# Patient Record
Sex: FEMALE | Race: WHITE | NOT HISPANIC OR LATINO | Employment: OTHER | URBAN - METROPOLITAN AREA
[De-identification: names, ages, dates, MRNs, and addresses within clinical notes are randomized per-mention and may not be internally consistent; named-entity substitution may affect disease eponyms.]

---

## 2020-08-19 ENCOUNTER — OFFICE VISIT (OUTPATIENT)
Dept: GASTROENTEROLOGY | Facility: CLINIC | Age: 50
End: 2020-08-19

## 2020-08-19 ENCOUNTER — LAB (OUTPATIENT)
Dept: LAB | Facility: HOSPITAL | Age: 50
End: 2020-08-19

## 2020-08-19 ENCOUNTER — PREP FOR SURGERY (OUTPATIENT)
Dept: OTHER | Facility: HOSPITAL | Age: 50
End: 2020-08-19

## 2020-08-19 VITALS
TEMPERATURE: 98 F | DIASTOLIC BLOOD PRESSURE: 74 MMHG | HEIGHT: 61 IN | SYSTOLIC BLOOD PRESSURE: 112 MMHG | HEART RATE: 74 BPM | BODY MASS INDEX: 36.44 KG/M2 | WEIGHT: 193 LBS | OXYGEN SATURATION: 98 %

## 2020-08-19 DIAGNOSIS — Z12.11 ENCOUNTER FOR SCREENING FOR MALIGNANT NEOPLASM OF COLON: ICD-10-CM

## 2020-08-19 DIAGNOSIS — R13.19 ESOPHAGEAL DYSPHAGIA: ICD-10-CM

## 2020-08-19 DIAGNOSIS — D64.9 ANEMIA, UNSPECIFIED TYPE: Primary | ICD-10-CM

## 2020-08-19 DIAGNOSIS — K59.04 CHRONIC IDIOPATHIC CONSTIPATION: ICD-10-CM

## 2020-08-19 DIAGNOSIS — K21.9 GASTROESOPHAGEAL REFLUX DISEASE, ESOPHAGITIS PRESENCE NOT SPECIFIED: ICD-10-CM

## 2020-08-19 LAB
FERRITIN SERPL-MCNC: 26.3 NG/ML (ref 13–150)
FOLATE SERPL-MCNC: >20 NG/ML (ref 4.78–24.2)
IRON 24H UR-MRATE: 40 MCG/DL (ref 37–145)
IRON SATN MFR SERPL: 7 % (ref 20–50)
TIBC SERPL-MCNC: 595 MCG/DL (ref 298–536)
TRANSFERRIN SERPL-MCNC: 399 MG/DL (ref 200–360)
VIT B12 BLD-MCNC: 481 PG/ML (ref 211–946)

## 2020-08-19 PROCEDURE — 83540 ASSAY OF IRON: CPT | Performed by: NURSE PRACTITIONER

## 2020-08-19 PROCEDURE — 82728 ASSAY OF FERRITIN: CPT | Performed by: NURSE PRACTITIONER

## 2020-08-19 PROCEDURE — 84466 ASSAY OF TRANSFERRIN: CPT | Performed by: NURSE PRACTITIONER

## 2020-08-19 PROCEDURE — 36415 COLL VENOUS BLD VENIPUNCTURE: CPT | Performed by: NURSE PRACTITIONER

## 2020-08-19 PROCEDURE — 82607 VITAMIN B-12: CPT | Performed by: NURSE PRACTITIONER

## 2020-08-19 PROCEDURE — 99214 OFFICE O/P EST MOD 30 MIN: CPT | Performed by: NURSE PRACTITIONER

## 2020-08-19 PROCEDURE — 82746 ASSAY OF FOLIC ACID SERUM: CPT | Performed by: NURSE PRACTITIONER

## 2020-08-19 RX ORDER — PROPRANOLOL HYDROCHLORIDE 10 MG/1
10 TABLET ORAL 3 TIMES DAILY
COMMUNITY

## 2020-08-19 RX ORDER — DULOXETIN HYDROCHLORIDE 60 MG/1
60 CAPSULE, DELAYED RELEASE ORAL DAILY
COMMUNITY
Start: 2017-07-04

## 2020-08-19 RX ORDER — FOLIC ACID 1 MG/1
1 TABLET ORAL DAILY
COMMUNITY
Start: 2020-05-08

## 2020-08-19 RX ORDER — DOCUSATE SODIUM 100 MG/1
100 CAPSULE, LIQUID FILLED ORAL DAILY
COMMUNITY
End: 2020-12-02 | Stop reason: SDUPTHER

## 2020-08-19 RX ORDER — LANSOPRAZOLE 30 MG/1
30 CAPSULE, DELAYED RELEASE ORAL DAILY
COMMUNITY
Start: 2019-07-22 | End: 2020-12-02 | Stop reason: SDUPTHER

## 2020-08-19 RX ORDER — METHOTREXATE 25 MG/ML
80 INJECTION, SOLUTION INTRA-ARTERIAL; INTRAMUSCULAR; INTRAVENOUS DAILY
COMMUNITY
Start: 2019-09-17 | End: 2020-12-02

## 2020-08-19 RX ORDER — ALBUTEROL SULFATE 90 UG/1
2 AEROSOL, METERED RESPIRATORY (INHALATION)
COMMUNITY
Start: 2017-04-03

## 2020-08-19 RX ORDER — PREDNISONE 1 MG/1
5 TABLET ORAL DAILY
COMMUNITY
Start: 2020-05-08

## 2020-08-19 RX ORDER — CHOLECALCIFEROL (VITAMIN D3) 50 MCG
2000 TABLET ORAL DAILY
COMMUNITY
Start: 2018-10-23 | End: 2023-02-14

## 2020-08-19 RX ORDER — CHLORTHALIDONE 25 MG/1
25 TABLET ORAL DAILY
COMMUNITY
End: 2023-02-14

## 2020-08-19 RX ORDER — SUCRALFATE 1 G/1
1 TABLET ORAL 2 TIMES DAILY
Status: ON HOLD | COMMUNITY
End: 2021-02-01

## 2020-08-19 RX ORDER — FERROUS SULFATE 325(65) MG
325 TABLET ORAL
Qty: 30 TABLET | Refills: 3 | Status: SHIPPED | OUTPATIENT
Start: 2020-08-19 | End: 2021-03-15

## 2020-08-19 RX ORDER — AMLODIPINE BESYLATE 10 MG/1
10 TABLET ORAL DAILY
COMMUNITY

## 2020-08-19 RX ORDER — QUETIAPINE FUMARATE 100 MG/1
300 TABLET, FILM COATED ORAL NIGHTLY
COMMUNITY

## 2020-08-19 RX ORDER — POTASSIUM CHLORIDE 20 MEQ/1
20 TABLET, EXTENDED RELEASE ORAL 2 TIMES DAILY
COMMUNITY

## 2020-08-19 NOTE — PROGRESS NOTES
Chief Complaint   Patient presents with   • Constipation   • Colonoscopy       HPI  Patient is a 50-year-old female who presents today for follow-up.  She was referred from her primary care provider to discuss screening colonoscopy.    She has a history of C. difficile, chronic constipation, GERD, esophageal Candida.  She had an EGD June 2019 with gastritis and a gastric ulcer and antral biopsies with focal intestinal metaplasia indeterminate for dysplasia.  Her most recent EGD was August 2019.  She had mild gastritis but the ulcer had healed.  Antral and gastric body biopsies with no intestinal metaplasia and no H. Pylori.    Colonoscopy had previously been attempted however she had poor bowel prep and Cologuard was recommended however insurance would not cover it at the time.    She had recent lab work with a CBC showing anemia with hemoglobin 9.2, hematocrit 31.9.  Per review, January 2020 hemoglobin was 8.8 and hematocrit 29.9.    Patient presents today for follow-up.  She reports she has had continued issues with constipation since her last office visit.  She is currently having around 2 bowel movements per week with the use of stool softeners twice daily.  She has previously tried MiraLAX with no improvement and Dulcolax with no significant improvement.  She also had previously tried Amitiza and Linzess with no improvement.  She denies any blood in the stool or dark stool.  She feels bloated and has some generalized abdominal discomfort secondary to the constipation.    She has noted some increased symptoms of reflux recently.  She has had increased belching and regurgitation.  She has had continued issues with dysphasia, feels as though when she is eating food becomes lodged in her chest.  She often times has to get up and move around to get the food to pass down but she has no forced regurgitation.  She denies any nausea or vomiting.    Patient reports the anemia is a new finding.  She has again not noted  any rectal bleeding or melena and denies any vaginal bleeding or hematuria.    Review of Systems   Constitutional: Positive for fatigue and unexpected weight change. Negative for appetite change, chills, diaphoresis and fever.   HENT: Negative for dental problem, ear pain, mouth sores, rhinorrhea, sore throat and voice change.    Eyes: Negative for pain, redness and visual disturbance.   Respiratory: Positive for cough and wheezing. Negative for chest tightness.    Cardiovascular: Positive for leg swelling. Negative for chest pain and palpitations.   Endocrine: Positive for cold intolerance, heat intolerance and polyphagia. Negative for polydipsia and polyuria.   Genitourinary: Negative for dysuria, frequency, hematuria and urgency.   Musculoskeletal: Positive for arthralgias, back pain, joint swelling, myalgias and neck pain.   Skin: Negative for rash.   Allergic/Immunologic: Positive for food allergies. Negative for environmental allergies and immunocompromised state.   Neurological: Negative for dizziness, seizures, weakness, numbness and headaches.   Hematological: Does not bruise/bleed easily.   Psychiatric/Behavioral: Positive for sleep disturbance. The patient is nervous/anxious.         Problem List:  There is no problem list on file for this patient.      Medical History:    Past Medical History:   Diagnosis Date   • Asthma    • Depression    • Fibromyalgia    • GERD (gastroesophageal reflux disease)    • Hypertension    • IBS (irritable bowel syndrome)    • Insomnia    • Peptic ulcer    • RA (rheumatoid arthritis) (CMS/[facility])         Social History:    Social History     Socioeconomic History   • Marital status:      Spouse name: Not on file   • Number of children: Not on file   • Years of education: Not on file   • Highest education level: Not on file   Tobacco Use   • Smoking status: Current Every Day Smoker   • Smokeless tobacco: Never Used   Substance and Sexual Activity   • Alcohol use: Not  Currently   • Drug use: Never   • Sexual activity: Defer       Family History:   Family History   Problem Relation Age of Onset   • Colon polyps Mother    • Colon polyps Father    • Crohn's disease Sister    • Colon cancer Maternal Grandmother    • Colon cancer Maternal Grandfather        Surgical History:   Past Surgical History:   Procedure Laterality Date   • APPENDECTOMY     • BREAST SURGERY     •  SECTION     • HYSTERECTOMY     • KNEE SURGERY Left    • TONSILLECTOMY     • WRIST SURGERY           Current Outpatient Medications:   •  Abatacept 125 MG/ML solution auto-injector, Inject 125 mg under the skin into the appropriate area as directed Every 7 (Seven) Days., Disp: , Rfl:   •  albuterol sulfate HFA (Ventolin HFA) 108 (90 Base) MCG/ACT inhaler, Inhale 2 puffs., Disp: , Rfl:   •  amLODIPine (NORVASC) 10 MG tablet, Take 10 mg by mouth Daily., Disp: , Rfl:   •  Aspirin Buf,CaCarb-MgCarb-MgO, 81 MG tablet, Take 81 mg by mouth., Disp: , Rfl:   •  chlorthalidone (HYGROTON) 25 MG tablet, Take 25 mg by mouth Daily., Disp: , Rfl:   •  Cholecalciferol (VITAMIN D) 50 MCG ( UT) tablet, Take 2,000 Units by mouth Daily., Disp: , Rfl:   •  docusate sodium (COLACE) 100 MG capsule, Take 100 mg by mouth Daily., Disp: , Rfl:   •  DULoxetine (CYMBALTA) 60 MG capsule, Take 60 mg by mouth Daily., Disp: , Rfl:   •  folic acid (FOLVITE) 1 MG tablet, Take 1 mg by mouth Daily., Disp: , Rfl:   •  lansoprazole (PREVACID) 30 MG capsule, Take 30 mg by mouth Daily., Disp: , Rfl:   •  Methotrexate Sodium 50 MG/2ML injection, Inject 80 mg under the skin into the appropriate area as directed Daily., Disp: , Rfl:   •  potassium chloride (K-DUR,KLOR-CON) 20 MEQ CR tablet, Take 20 mEq by mouth 2 (Two) Times a Day., Disp: , Rfl:   •  predniSONE (DELTASONE) 5 MG tablet, Take 5 mg by mouth Daily., Disp: , Rfl:   •  propranolol (INDERAL) 10 MG tablet, Take 10 mg by mouth 3 (Three) Times a Day., Disp: , Rfl:   •  QUEtiapine (SEROquel)  100 MG tablet, Take 300 mg by mouth Every Night., Disp: , Rfl:   •  sucralfate (CARAFATE) 1 g tablet, Take 1 g by mouth 2 (Two) Times a Day., Disp: , Rfl:   •  Plecanatide (Trulance) 3 MG tablet, Take 1 tablet by mouth Daily., Disp: 30 tablet, Rfl: 5    Allergies:  Banana; Butorphanol; Penicillins; and Sulfa antibiotics    The following portions of the patient's history were reviewed and updated as appropriate: allergies, current medications, past family history, past medical history, past social history, past surgical history and problem list.    Vitals:    08/19/20 1016   BP: 112/74   Pulse: 74   Temp: 98 °F (36.7 °C)   SpO2: 98%         08/19/20  1016   Weight: 87.5 kg (193 lb)     Body mass index is 36.47 kg/m².    Physical Exam   Constitutional: She is oriented to person, place, and time. She appears well-developed and well-nourished. No distress.   HENT:   Head: Normocephalic and atraumatic.   Cardiovascular: Normal rate and regular rhythm.   Pulmonary/Chest: Effort normal and breath sounds normal. No respiratory distress.   Abdominal: Soft. Bowel sounds are normal. She exhibits distension. There is no tenderness.   Neurological: She is alert and oriented to person, place, and time.   Skin: Skin is dry. No pallor.   Psychiatric: She has a normal mood and affect. Thought content normal.   Vitals reviewed.        Assessment/ Plan  Jazzy was seen today for constipation and colonoscopy.    Diagnoses and all orders for this visit:    Anemia, unspecified type  -     Folate  -     Iron Profile  -     Ferritin  -     Vitamin B12    Chronic idiopathic constipation    Gastroesophageal reflux disease, esophagitis presence not specified    Esophageal dysphagia    Other orders  -     Plecanatide (Trulance) 3 MG tablet; Take 1 tablet by mouth Daily.         Return for Review results after testing complete.    Patient Instructions   Schedule EGD for further evaluation of anemia with worsening GERD and dysphagia and history  of gastric ulcer.    Schedule colonoscopy for colon cancer screening and further evaluation of anemia.  We will plan on 2-day prep at time of colonoscopy using magnesium citrate on day 1 and Plenvu on day 2.    Check lab work today for further evaluation of anemia.    For constipation, begin taking trulance once daily as prescribed.  Samples provided and prescription sent to pharmacy.  Continue stool softeners twice daily.    Please contact the office if Trulance does not help constipation and we can provide further recommendations       Discussion:  At today's office visit, we discussed Cologuard versus colonoscopy for colon cancer screening.  Due to her new anemia, I did discuss with her recommendation for colonoscopy for a more complete evaluation.  Patient is agreeable to proceed with repeat attempt at colonoscopy, we will plan on a 2-day prep as outlined above.  If colonoscopy is unsuccessful due to poor prep, we will then consider Cologuard for screening purposes.

## 2020-08-19 NOTE — PATIENT INSTRUCTIONS
Schedule EGD for further evaluation of anemia with worsening GERD and dysphagia and history of gastric ulcer.    Schedule colonoscopy for colon cancer screening and further evaluation of anemia.  We will plan on 2-day prep at time of colonoscopy using magnesium citrate on day 1 and Plenvu on day 2.    Check lab work today for further evaluation of anemia.    For constipation, begin taking trulance once daily as prescribed.  Samples provided and prescription sent to pharmacy.  Continue stool softeners twice daily.    Please contact the office if Trulance does not help constipation and we can provide further recommendations

## 2020-10-08 ENCOUNTER — LAB REQUISITION (OUTPATIENT)
Dept: LAB | Facility: HOSPITAL | Age: 50
End: 2020-10-08

## 2020-10-08 DIAGNOSIS — Z00.00 ENCOUNTER FOR GENERAL ADULT MEDICAL EXAMINATION WITHOUT ABNORMAL FINDINGS: ICD-10-CM

## 2020-10-10 PROCEDURE — U0004 COV-19 TEST NON-CDC HGH THRU: HCPCS | Performed by: INTERNAL MEDICINE

## 2020-10-12 ENCOUNTER — TELEPHONE (OUTPATIENT)
Dept: GASTROENTEROLOGY | Facility: CLINIC | Age: 50
End: 2020-10-12

## 2020-10-12 LAB — SARS-COV-2 RNA RESP QL NAA+PROBE: NOT DETECTED

## 2020-10-12 NOTE — TELEPHONE ENCOUNTER
Informed patient.  Verbalized understanding.  She would like to hold off on cancelling CLS yet as she is hoping she will start to clear.  Explained if she was not clear then in the morning, she would need to tell preop as it would be best to only do EGD if not cleaned out.  Called Benedicto in ENDO and filled her in on situation to check again with patient in am.

## 2020-10-12 NOTE — TELEPHONE ENCOUNTER
Patient is scheduled for a CLS at 9 am tomorrow morning , patient stated she has  been doing the prep for two days and has not had a BM . She has a history of not being cleaned out good for the procedure . Please advise      [phone]

## 2020-10-12 NOTE — TELEPHONE ENCOUNTER
If she has not yet had a bowel movement, I would recommend we reschedule as I am concerned at this point she will not be prepped well enough for the colonoscopy.    Please let her know we will need to reschedule and please schedule follow up visit to discuss treatment of constipation and prep options.    If she wants to go ahead with the EGD tomorrow, that is fine and we can reschedule the CLS.

## 2020-10-12 NOTE — TELEPHONE ENCOUNTER
Patient stated she has taken two bottle of citrate magnesium  , Plendu kit . Patient stated she has nothing else left to take. Pt stated a family member recommend Sorbitol prep . [facility] [phone]

## 2020-10-12 NOTE — TELEPHONE ENCOUNTER
Please obtain further information.  What has she taken so far and what does she have left to take for the prep?

## 2020-10-21 ENCOUNTER — TELEPHONE (OUTPATIENT)
Dept: GASTROENTEROLOGY | Facility: CLINIC | Age: 50
End: 2020-10-21

## 2020-10-21 DIAGNOSIS — K59.04 CHRONIC IDIOPATHIC CONSTIPATION: Primary | ICD-10-CM

## 2020-10-21 NOTE — TELEPHONE ENCOUNTER
Patient was scheduled for a CLS on the 13th , had to cancel because she was not able to clean herself out before the procedure . Patient has not had a BM since 10/11/2020 , is experiencing some abdominal discomfort and bloating. Patient stated she has taken 3 bottles of the magnesium citrate and miralax and can not have a BM , please advise.    Ms Diaz [phone]

## 2020-10-22 ENCOUNTER — HOSPITAL ENCOUNTER (OUTPATIENT)
Dept: GENERAL RADIOLOGY | Facility: HOSPITAL | Age: 50
Discharge: HOME | End: 2020-10-22

## 2020-10-22 ENCOUNTER — LAB (OUTPATIENT)
Dept: LAB | Facility: HOSPITAL | Age: 50
End: 2020-10-22

## 2020-10-22 ENCOUNTER — OFFICE VISIT (OUTPATIENT)
Dept: GASTROENTEROLOGY | Facility: CLINIC | Age: 50
End: 2020-10-22

## 2020-10-22 VITALS
OXYGEN SATURATION: 96 % | SYSTOLIC BLOOD PRESSURE: 130 MMHG | HEART RATE: 81 BPM | DIASTOLIC BLOOD PRESSURE: 72 MMHG | TEMPERATURE: 98.4 F | HEIGHT: 61 IN | WEIGHT: 202 LBS | BODY MASS INDEX: 38.14 KG/M2

## 2020-10-22 DIAGNOSIS — D64.9 ANEMIA, UNSPECIFIED TYPE: ICD-10-CM

## 2020-10-22 DIAGNOSIS — R14.0 ABDOMINAL BLOATING: ICD-10-CM

## 2020-10-22 DIAGNOSIS — K83.8 COMMON BILE DUCT DILATION: ICD-10-CM

## 2020-10-22 DIAGNOSIS — K59.04 CHRONIC IDIOPATHIC CONSTIPATION: ICD-10-CM

## 2020-10-22 DIAGNOSIS — R79.89 LFT ELEVATION: ICD-10-CM

## 2020-10-22 DIAGNOSIS — K21.9 GASTROESOPHAGEAL REFLUX DISEASE, UNSPECIFIED WHETHER ESOPHAGITIS PRESENT: ICD-10-CM

## 2020-10-22 DIAGNOSIS — R10.11 RIGHT UPPER QUADRANT ABDOMINAL PAIN: Primary | ICD-10-CM

## 2020-10-22 LAB
ALBUMIN SERPL-MCNC: 3.9 G/DL (ref 3.5–5.2)
ALBUMIN/GLOB SERPL: 1 G/DL
ALP SERPL-CCNC: 140 U/L (ref 39–117)
ALT SERPL W P-5'-P-CCNC: 15 U/L (ref 1–33)
ANION GAP SERPL CALCULATED.3IONS-SCNC: 8.7 MMOL/L (ref 5–15)
AST SERPL-CCNC: 16 U/L (ref 1–32)
BASOPHILS # BLD AUTO: 0.04 10*3/MM3 (ref 0–0.2)
BASOPHILS NFR BLD AUTO: 0.6 % (ref 0–1.5)
BILIRUB SERPL-MCNC: <0.2 MG/DL (ref 0–1.2)
BUN SERPL-MCNC: 17 MG/DL (ref 6–20)
BUN/CREAT SERPL: 28.8 (ref 7–25)
CALCIUM SPEC-SCNC: 9.3 MG/DL (ref 8.6–10.5)
CHLORIDE SERPL-SCNC: 94 MMOL/L (ref 98–107)
CO2 SERPL-SCNC: 30.3 MMOL/L (ref 22–29)
CREAT SERPL-MCNC: 0.59 MG/DL (ref 0.57–1)
DEPRECATED RDW RBC AUTO: 53.2 FL (ref 37–54)
EOSINOPHIL # BLD AUTO: 0.22 10*3/MM3 (ref 0–0.4)
EOSINOPHIL NFR BLD AUTO: 3.2 % (ref 0.3–6.2)
ERYTHROCYTE [DISTWIDTH] IN BLOOD BY AUTOMATED COUNT: 19.9 % (ref 12.3–15.4)
GFR SERPL CREATININE-BSD FRML MDRD: 108 ML/MIN/1.73
GLOBULIN UR ELPH-MCNC: 4.1 GM/DL
GLUCOSE SERPL-MCNC: 89 MG/DL (ref 65–99)
HCT VFR BLD AUTO: 34.1 % (ref 34–46.6)
HGB BLD-MCNC: 10.1 G/DL (ref 12–15.9)
HYPOCHROMIA BLD QL: NORMAL
IMM GRANULOCYTES # BLD AUTO: 0.01 10*3/MM3 (ref 0–0.05)
IMM GRANULOCYTES NFR BLD AUTO: 0.1 % (ref 0–0.5)
LYMPHOCYTES # BLD AUTO: 1.97 10*3/MM3 (ref 0.7–3.1)
LYMPHOCYTES NFR BLD AUTO: 29 % (ref 19.6–45.3)
MCH RBC QN AUTO: 22.1 PG (ref 26.6–33)
MCHC RBC AUTO-ENTMCNC: 29.6 G/DL (ref 31.5–35.7)
MCV RBC AUTO: 74.5 FL (ref 79–97)
MONOCYTES # BLD AUTO: 0.5 10*3/MM3 (ref 0.1–0.9)
MONOCYTES NFR BLD AUTO: 7.4 % (ref 5–12)
NEUTROPHILS NFR BLD AUTO: 4.06 10*3/MM3 (ref 1.7–7)
NEUTROPHILS NFR BLD AUTO: 59.7 % (ref 42.7–76)
NRBC BLD AUTO-RTO: 0 /100 WBC (ref 0–0.2)
OVALOCYTES BLD QL SMEAR: NORMAL
PLAT MORPH BLD: NORMAL
PLATELET # BLD AUTO: 318 10*3/MM3 (ref 140–450)
PMV BLD AUTO: 9.8 FL (ref 6–12)
POTASSIUM SERPL-SCNC: 3.7 MMOL/L (ref 3.5–5.2)
PROT SERPL-MCNC: 8 G/DL (ref 6–8.5)
RBC # BLD AUTO: 4.58 10*6/MM3 (ref 3.77–5.28)
SODIUM SERPL-SCNC: 133 MMOL/L (ref 136–145)
STOMATOCYTES BLD QL SMEAR: NORMAL
WBC # BLD AUTO: 6.8 10*3/MM3 (ref 3.4–10.8)
WBC MORPH BLD: NORMAL

## 2020-10-22 PROCEDURE — 85025 COMPLETE CBC W/AUTO DIFF WBC: CPT | Performed by: NURSE PRACTITIONER

## 2020-10-22 PROCEDURE — 85007 BL SMEAR W/DIFF WBC COUNT: CPT | Performed by: NURSE PRACTITIONER

## 2020-10-22 PROCEDURE — 99215 OFFICE O/P EST HI 40 MIN: CPT | Performed by: NURSE PRACTITIONER

## 2020-10-22 PROCEDURE — 36415 COLL VENOUS BLD VENIPUNCTURE: CPT | Performed by: NURSE PRACTITIONER

## 2020-10-22 PROCEDURE — 74018 RADEX ABDOMEN 1 VIEW: CPT

## 2020-10-22 PROCEDURE — 80053 COMPREHEN METABOLIC PANEL: CPT | Performed by: NURSE PRACTITIONER

## 2020-10-22 RX ORDER — ONDANSETRON 4 MG/1
TABLET, FILM COATED ORAL
Status: ON HOLD | COMMUNITY
Start: 2020-10-08 | End: 2021-02-01

## 2020-10-22 RX ORDER — DULOXETIN HYDROCHLORIDE 30 MG/1
CAPSULE, DELAYED RELEASE ORAL
COMMUNITY
Start: 2020-10-03

## 2020-10-22 RX ORDER — GABAPENTIN 300 MG/1
CAPSULE ORAL
Status: ON HOLD | COMMUNITY
Start: 2020-09-02 | End: 2021-02-01

## 2020-10-22 RX ORDER — METHADONE HYDROCHLORIDE 10 MG/1
90 TABLET ORAL DAILY
COMMUNITY

## 2020-10-22 NOTE — PROGRESS NOTES
Chief Complaint   Patient presents with   • Bloated   • Abdominal Pain       HPI  Patient is a 50-year-old female who presents today for follow-up.    She has a history of C. difficile, chronic constipation, GERD, esophageal Candida.  She had an EGD June 2019 with gastritis and a gastric ulcer and antral biopsies with focal intestinal metaplasia indeterminate for dysplasia.  Her most recent EGD was August 2019.  She had mild gastritis but the ulcer had healed.  Antral and gastric body biopsies with no intestinal metaplasia and no H. Pylori.     Colonoscopy had previously been attempted however she had poor bowel prep and Cologuard was recommended however insurance would not cover it at the time.    She was last seen in the office August 2020 for anemia, constipation, GERD, dysphagia.    EGD and colonoscopy were planned.  She was given instructions for a 2-day bowel prep however despite this, she was unable to get adequately cleaned out.    At her last office visit, she was given a prescription for Trulance.    Patient reports he drank 3 bottles of magnesium citrate and the entirety of the Plenvu prep and following this did not have a bowel movement for 9 days.  She did drink a full quantities of the prep.    She has had continued significant difficulties with constipation since her last office visit.  She reports abdominal distention and bloating associated with this.  She feels full and has had difficulty eating due to the significant constipation.  She feels nauseated but has had no vomiting.  She has seen no visible blood in the stool.    [facility]  She reports she was admitted for sepsis and right upper quadrant pain.Per review of records, CT scan showed mild bile duct dilatation and large stool burden and MRCP was recommended.  Infectious disease was consulted who felt sepsis was secondary to ascending cholangitis.  Her liver enzymes were elevated during the hospital  stay.  MRCP was performed and showed chronic moderate extrahepatic bile duct dilatation without evidence for choledocholithiasis or obstructing mass.  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  Also incidental pancreatic cyst.  At presentation to the hospital, ALT was 213, AST was 418, alkaline phosphatase was 315.  On day of discharge, ALT was 77, AST 36, alk phos 170.  She was found to have hep C antibody positive with hep C RNA being negative.  Covid testing was negative.    Patient reports she has had some continued right upper quadrant discomfort since her discharge.    She remains anemic though hemoglobin remains relatively stable at 9.1.    She continues to report difficulty with reflux and this has worsened since her constipation has been exacerbated.    For constipation, at her last office visit she was given a prescription for Trulance which did not lead to any improvement.  She has previously tried Amitiza and Linzess with no improvement.  She takes Colace on a daily basis.  She is on methadone, she believes she may have tried Movantik and Relistor in the past but is uncertain.    Review of Systems   Constitutional: Positive for appetite change, fatigue and unexpected weight change. Negative for chills, diaphoresis and fever.   HENT: Negative for dental problem, ear pain, mouth sores, rhinorrhea, sore throat and voice change.    Eyes: Negative for pain, redness and visual disturbance.   Respiratory: Positive for cough and wheezing. Negative for chest tightness.    Cardiovascular: Positive for leg swelling. Negative for chest pain and palpitations.   Endocrine: Positive for cold intolerance and heat intolerance. Negative for polydipsia, polyphagia and polyuria.   Genitourinary: Negative for dysuria, frequency, hematuria and urgency.   Musculoskeletal: Positive for arthralgias, back pain, myalgias and neck pain. Negative for joint swelling.   Skin: Negative for rash.   Allergic/Immunologic: Positive for food  allergies and immunocompromised state. Negative for environmental allergies.   Neurological: Positive for weakness. Negative for dizziness, seizures, numbness and headaches.   Hematological: Does not bruise/bleed easily.   Psychiatric/Behavioral: Positive for sleep disturbance. The patient is nervous/anxious.         Problem List:  There is no problem list on file for this patient.      Medical History:    Past Medical History:   Diagnosis Date   • Abdominal pain    • Abscess    • Allergic reaction    • Arthritis    • Asthma    • Baker's cyst    • Bloating    • C. difficile colitis    • Cellulitis of arm    • Community acquired pneumonia    • Conjunctivitis    • Constipation    • Current every day smoker     smokes less than 1 pack per day for 30 years   • Depression    • Dilated bile duct    • Dysphagia    • Early satiety    • Excessive daytime sleepiness    • Fibromyalgia    • Flu-like symptoms    • Gastritis    • Gastroesophageal reflux disease    • GERD (gastroesophageal reflux disease)    • Hand swelling    • High blood pressure    • Hip pain    • History of depression    • History of kidney stones    • Hives    • Hypertension    • IBS (irritable bowel syndrome)    • Influenza A    • Insomnia    • Left shoulder pain    • Leg pain    • Leg swelling    • Low back pain    • Lupus (CMS/HCC)    • Major depression    • NSAID long-term use    • Onychomycosis of toenail    • Palpitations    • Pancreatic cyst    • Peptic ulcer    • RA (rheumatoid arthritis) (CMS/HCC)    • Rheumatoid factor positive    • Screening for colon cancer    • Shoulder injury    • Sleep apnea    • Sweating increase    • Synovitis of wrist    • Therapeutic opioid induced constipation    • Weight loss    • Wrist pain         Social History:    Social History     Socioeconomic History   • Marital status:      Spouse name: Not on file   • Number of children: Not on file   • Years of education: Not on file   • Highest education level: Not on  file   Tobacco Use   • Smoking status: Current Every Day Smoker   • Smokeless tobacco: Never Used   • Tobacco comment: smokes less than 1 pack per day for 30 years   Substance and Sexual Activity   • Alcohol use: Not Currently     Comment: drank alcohol in the past, 7 or  less drinks per week   • Drug use: Not Currently     Comment: in the past   • Sexual activity: Defer       Family History:   Family History   Problem Relation Age of Onset   • Colon polyps Mother    • Colon polyps Father    • Crohn's disease Sister    • Colon cancer Maternal Grandmother    • Colon cancer Maternal Grandfather    • Arthritis Other    • Crohn's disease Other    • Other Other         gastroparesis   • COPD Other    • Colon cancer Other        Surgical History:   Past Surgical History:   Procedure Laterality Date   • ABDOMINAL SURGERY     • APPENDECTOMY     • BREAST SURGERY      Enlargement procedure   •  SECTION     • COLONOSCOPY  2019    Dr. Alexander   • HYSTERECTOMY      Not due to cancer   • KNEE SURGERY Left    • NEUROPLASTY      Neuroplasty Median Nerve at Carpal Tunnel   • REPLACEMENT TOTAL KNEE     • TONSILLECTOMY     • UPPER GASTROINTESTINAL ENDOSCOPY  2019    Dr. Alexander   • WRIST SURGERY           Current Outpatient Medications:   •  Abatacept 125 MG/ML solution auto-injector, Inject 125 mg under the skin into the appropriate area as directed Every 7 (Seven) Days., Disp: , Rfl:   •  albuterol sulfate HFA (Ventolin HFA) 108 (90 Base) MCG/ACT inhaler, Inhale 2 puffs., Disp: , Rfl:   •  amLODIPine (NORVASC) 10 MG tablet, Take 10 mg by mouth Daily., Disp: , Rfl:   •  Aspirin Buf,CaCarb-MgCarb-MgO, 81 MG tablet, Take 81 mg by mouth., Disp: , Rfl:   •  chlorthalidone (HYGROTON) 25 MG tablet, Take 25 mg by mouth Daily., Disp: , Rfl:   •  Cholecalciferol (VITAMIN D) 50 MCG (2000 UT) tablet, Take 2,000 Units by mouth Daily., Disp: , Rfl:   •  Ciprofloxacin-Ciproflox HCl ER (Cipro XR) 1000 MG tablet sustained-release  24 hour, Take 100 mg by mouth 2 (two) times a day., Disp: , Rfl:   •  docusate sodium (COLACE) 100 MG capsule, Take 100 mg by mouth Daily., Disp: , Rfl:   •  DULoxetine (CYMBALTA) 30 MG capsule, , Disp: , Rfl:   •  DULoxetine (CYMBALTA) 60 MG capsule, Take 60 mg by mouth Daily., Disp: , Rfl:   •  ferrous sulfate 325 (65 FE) MG tablet, Take 1 tablet by mouth Daily With Breakfast., Disp: 30 tablet, Rfl: 3  •  folic acid (FOLVITE) 1 MG tablet, Take 1 mg by mouth Daily., Disp: , Rfl:   •  gabapentin (NEURONTIN) 300 MG capsule, , Disp: , Rfl:   •  lansoprazole (PREVACID) 30 MG capsule, Take 30 mg by mouth Daily., Disp: , Rfl:   •  methadone (DOLOPHINE) 10 MG tablet, Take 90 mg by mouth Daily,, Disp: , Rfl:   •  Methotrexate Sodium 50 MG/2ML injection, Inject 80 mg under the skin into the appropriate area as directed Daily., Disp: , Rfl:   •  ondansetron (ZOFRAN) 4 MG tablet, , Disp: , Rfl:   •  Plecanatide (Trulance) 3 MG tablet, Take 1 tablet by mouth Daily., Disp: 30 tablet, Rfl: 5  •  potassium chloride (K-DUR,KLOR-CON) 20 MEQ CR tablet, Take 20 mEq by mouth 2 (Two) Times a Day., Disp: , Rfl:   •  predniSONE (DELTASONE) 5 MG tablet, Take 5 mg by mouth Daily., Disp: , Rfl:   •  propranolol (INDERAL) 10 MG tablet, Take 10 mg by mouth 3 (Three) Times a Day., Disp: , Rfl:   •  QUEtiapine (SEROquel) 100 MG tablet, Take 300 mg by mouth Every Night., Disp: , Rfl:   •  sucralfate (CARAFATE) 1 g tablet, Take 1 g by mouth 2 (Two) Times a Day., Disp: , Rfl:     Allergies:  Banana, Butorphanol, Penicillins, and Sulfa antibiotics    The following portions of the patient's history were reviewed and updated as appropriate: allergies, current medications, past family history, past medical history, past social history, past surgical history and problem list.    Vitals:    10/22/20 1018   BP: 130/72   Pulse: 81   Temp: 98.4 °F (36.9 °C)   SpO2: 96%         10/22/20  1018   Weight: 91.6 kg (202 lb)     Body mass index is 38.19  kg/m².    Physical Exam  Vitals signs reviewed.   Constitutional:       General: She is not in acute distress.     Appearance: She is well-developed.   HENT:      Head: Normocephalic and atraumatic.   Eyes:      General: No scleral icterus.  Cardiovascular:      Rate and Rhythm: Normal rate and regular rhythm.   Pulmonary:      Effort: Pulmonary effort is normal. No respiratory distress.      Breath sounds: Normal breath sounds.   Abdominal:      General: Abdomen is protuberant. Bowel sounds are normal. There is distension.      Tenderness: There is no abdominal tenderness.   Skin:     General: Skin is warm and dry.   Neurological:      Mental Status: She is alert and oriented to person, place, and time.   Psychiatric:         Thought Content: Thought content normal.           Assessment/ Plan  Diagnoses and all orders for this visit:    1. Right upper quadrant abdominal pain (Primary)  -     Comprehensive Metabolic Panel  -     CBC & Differential  -     CBC Auto Differential  -     Ambulatory Referral to Gastroenterology    2. Anemia, unspecified type  -     Comprehensive Metabolic Panel  -     CBC & Differential  -     CBC Auto Differential    3. Chronic idiopathic constipation    4. Abdominal bloating    5. Gastroesophageal reflux disease, unspecified whether esophagitis present    6. LFT elevation  -     Ambulatory Referral to Gastroenterology    7. Common bile duct dilation  -     Ambulatory Referral to Gastroenterology         No follow-ups on file.    Patient Instructions   Obtain abdominal x-ray today.    Check lab work today for monitoring.    Refer to Dr. Land for consultation for possible ERCP regarding bile duct dilatation seen on imaging, right upper quadrant pain, and LFT elevation.    Further recommendations to be made pending results of x-ray and lab work.  Once x-ray has resulted, will provide additional recommendations regarding bowel regimen and once bowels are moving more regularly, we will  plan to reschedule EGD and colonoscopy to evaluate anemia.       Discussion:  Hospital records reviewed during today's office visit as summarized in HPI.  Will proceed as outlined above.  Discussed with patient today that we do need to proceed with EGD and colonoscopy for further evaluation of anemia however with her significant constipation and difficulty with bowel prep, I feel we need to work on her bowel regimen first to ensure an adequate prep.  Will provide further recommendations once x-ray has resulted and rule out an obstructive etiology.  Discussed with patient today that we will likely need to hit multiple modalities for her constipation, may need to consider medication for opioid-induced constipation last laxatives plus potential suppositories.  We discussed consideration for trial of Motegrity, patient denies any suicidal ideation or depression at this time and discussed risks associated with this with use of this medication.  Final recommendations were made pending results of x-ray.

## 2020-10-22 NOTE — PATIENT INSTRUCTIONS
Obtain abdominal x-ray today.    Check lab work today for monitoring.    Refer to Dr. Land for consultation for possible ERCP regarding bile duct dilatation seen on imaging, right upper quadrant pain, and LFT elevation.    Further recommendations to be made pending results of x-ray and lab work.  Once x-ray has resulted, will provide additional recommendations regarding bowel regimen and once bowels are moving more regularly, we will plan to reschedule EGD and colonoscopy to evaluate anemia.

## 2020-10-23 RX ORDER — NALOXEGOL OXALATE 25 MG/1
25 TABLET, FILM COATED ORAL EVERY MORNING
Qty: 30 TABLET | Refills: 1 | Status: SHIPPED | OUTPATIENT
Start: 2020-10-23 | End: 2020-12-02 | Stop reason: SDUPTHER

## 2020-10-23 RX ORDER — BISACODYL 10 MG
10 SUPPOSITORY, RECTAL RECTAL DAILY
Qty: 28 SUPPOSITORY | Refills: 2 | Status: SHIPPED | OUTPATIENT
Start: 2020-10-23 | End: 2021-02-18

## 2020-10-30 ENCOUNTER — TELEPHONE (OUTPATIENT)
Dept: GASTROENTEROLOGY | Facility: CLINIC | Age: 50
End: 2020-10-30

## 2020-11-02 ENCOUNTER — TELEPHONE (OUTPATIENT)
Dept: GASTROENTEROLOGY | Facility: CLINIC | Age: 50
End: 2020-11-02

## 2020-11-02 NOTE — TELEPHONE ENCOUNTER
Stephanie Watt from Dr. Barnes's office would like you to schedule ERCP for this patient, please advise

## 2020-11-03 NOTE — TELEPHONE ENCOUNTER
Patient called back and stated that she has been experiencing abdominal pain over the past 2-3 weeks. She stated that her bowels have began to move using everything in the message below but the suppositories. Appointment made 11/09/2020 with BETO Curiel. Nothing further needed.      TS

## 2020-11-06 ENCOUNTER — PREP FOR SURGERY (OUTPATIENT)
Dept: OTHER | Facility: HOSPITAL | Age: 50
End: 2020-11-06

## 2020-11-06 DIAGNOSIS — K83.1 AMPULLARY STENOSIS: Primary | ICD-10-CM

## 2020-11-09 ENCOUNTER — TRANSCRIBE ORDERS (OUTPATIENT)
Dept: SLEEP MEDICINE | Facility: HOSPITAL | Age: 50
End: 2020-11-09

## 2020-11-09 DIAGNOSIS — Z01.818 OTHER SPECIFIED PRE-OPERATIVE EXAMINATION: Primary | ICD-10-CM

## 2020-11-09 PROBLEM — K83.1 AMPULLARY STENOSIS: Status: ACTIVE | Noted: 2020-11-09

## 2020-11-11 ENCOUNTER — LAB (OUTPATIENT)
Dept: LAB | Facility: HOSPITAL | Age: 50
End: 2020-11-11

## 2020-11-11 DIAGNOSIS — Z01.818 OTHER SPECIFIED PRE-OPERATIVE EXAMINATION: ICD-10-CM

## 2020-11-11 PROCEDURE — U0004 COV-19 TEST NON-CDC HGH THRU: HCPCS

## 2020-11-11 PROCEDURE — C9803 HOPD COVID-19 SPEC COLLECT: HCPCS

## 2020-11-12 LAB — SARS-COV-2 RNA RESP QL NAA+PROBE: NOT DETECTED

## 2020-11-13 ENCOUNTER — ANESTHESIA (OUTPATIENT)
Dept: GASTROENTEROLOGY | Facility: HOSPITAL | Age: 50
End: 2020-11-13

## 2020-11-13 ENCOUNTER — HOSPITAL ENCOUNTER (OUTPATIENT)
Facility: HOSPITAL | Age: 50
Setting detail: HOSPITAL OUTPATIENT SURGERY
Discharge: HOME | End: 2020-11-13
Attending: INTERNAL MEDICINE | Admitting: INTERNAL MEDICINE

## 2020-11-13 ENCOUNTER — APPOINTMENT (OUTPATIENT)
Dept: GENERAL RADIOLOGY | Facility: HOSPITAL | Age: 50
End: 2020-11-13

## 2020-11-13 ENCOUNTER — ANESTHESIA EVENT (OUTPATIENT)
Dept: GASTROENTEROLOGY | Facility: HOSPITAL | Age: 50
End: 2020-11-13

## 2020-11-13 VITALS
WEIGHT: 207 LBS | BODY MASS INDEX: 39.08 KG/M2 | OXYGEN SATURATION: 94 % | SYSTOLIC BLOOD PRESSURE: 122 MMHG | TEMPERATURE: 98 F | HEART RATE: 86 BPM | HEIGHT: 61 IN | RESPIRATION RATE: 16 BRPM | DIASTOLIC BLOOD PRESSURE: 93 MMHG

## 2020-11-13 DIAGNOSIS — K83.1 AMPULLARY STENOSIS: ICD-10-CM

## 2020-11-13 PROCEDURE — 25010000002 PROPOFOL 10 MG/ML EMULSION: Performed by: ANESTHESIOLOGY

## 2020-11-13 PROCEDURE — 25010000002 IOPAMIDOL 61 % SOLUTION: Performed by: INTERNAL MEDICINE

## 2020-11-13 PROCEDURE — C1769 GUIDE WIRE: HCPCS | Performed by: INTERNAL MEDICINE

## 2020-11-13 PROCEDURE — 74328 X-RAY BILE DUCT ENDOSCOPY: CPT

## 2020-11-13 PROCEDURE — 43262 ENDO CHOLANGIOPANCREATOGRAPH: CPT | Performed by: INTERNAL MEDICINE

## 2020-11-13 RX ORDER — SODIUM CHLORIDE, SODIUM LACTATE, POTASSIUM CHLORIDE, CALCIUM CHLORIDE 600; 310; 30; 20 MG/100ML; MG/100ML; MG/100ML; MG/100ML
30 INJECTION, SOLUTION INTRAVENOUS CONTINUOUS PRN
Status: DISCONTINUED | OUTPATIENT
Start: 2020-11-13 | End: 2020-11-13 | Stop reason: HOSPADM

## 2020-11-13 RX ORDER — PROPOFOL 10 MG/ML
VIAL (ML) INTRAVENOUS CONTINUOUS PRN
Status: DISCONTINUED | OUTPATIENT
Start: 2020-11-13 | End: 2020-11-13 | Stop reason: SURG

## 2020-11-13 RX ORDER — PROMETHAZINE HYDROCHLORIDE 25 MG/1
25 TABLET ORAL ONCE AS NEEDED
Status: DISCONTINUED | OUTPATIENT
Start: 2020-11-13 | End: 2020-11-13 | Stop reason: HOSPADM

## 2020-11-13 RX ORDER — PROMETHAZINE HYDROCHLORIDE 25 MG/1
25 SUPPOSITORY RECTAL ONCE AS NEEDED
Status: DISCONTINUED | OUTPATIENT
Start: 2020-11-13 | End: 2020-11-13 | Stop reason: HOSPADM

## 2020-11-13 RX ADMIN — SODIUM CHLORIDE, POTASSIUM CHLORIDE, SODIUM LACTATE AND CALCIUM CHLORIDE: 600; 310; 30; 20 INJECTION, SOLUTION INTRAVENOUS at 10:46

## 2020-11-13 RX ADMIN — PROPOFOL 200 MCG/KG/MIN: 10 INJECTION, EMULSION INTRAVENOUS at 10:51

## 2020-11-13 NOTE — INTERVAL H&P NOTE
H&P updated. 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

## 2020-11-13 NOTE — ANESTHESIA PREPROCEDURE EVALUATION
Anesthesia Evaluation     NPO Solid Status: > 8 hours             Airway   TM distance: >3 FB  Neck ROM: full  Dental    (+) edentulous    Pulmonary - normal exam   (+) a smoker Current Smoked day of surgery, asthma,sleep apnea,   Cardiovascular - normal exam    (+) hypertension,       Neuro/Psych  GI/Hepatic/Renal/Endo    (+)  GERD,      Musculoskeletal     Abdominal    Substance History      OB/GYN          Other                        Anesthesia Plan    ASA 3     MAC       Anesthetic plan, all risks, benefits, and alternatives have been provided, discussed and informed consent has been obtained with: patient.

## 2020-11-13 NOTE — BRIEF OP NOTE
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]    Procedure/CPT® Codes:        Procedure(s):  ENDOSCOPIC RETROGRADE CHOLANGIOPANCREATOGRAPHY WITH CHOLANGIOGRAM, SPHINCTEROTOMY AND BALLOON SWEEP    Surgeon(s):  Wesley Land MD    Anesthesia: Monitored Anesthesia Care    Staff:   Radiology Technologist: Geno Mota RRT  Endo Technician: Shari Weaver Zyon  Endo Nurse: Alana Edmonds RN         Estimated Blood Loss: minimal    Urine Voided: * No values recorded between 11/13/2020 10:46 AM and 11/13/2020 11:12 AM *    Specimens:                None          Drains: * No LDAs found *    Findings: ERCP with sphincterotomy and balloon sweep revealed moderate ampullary stenosis making cannulation slightly difficult but once in no significant stricture found.  Sphincterotomy resolved the obstruction balloon sweep showed no abnormalities or stones.    Complications: None          Wesley Land MD     Date: 11/13/2020  Time: 11:15 EST

## 2020-11-13 NOTE — ANESTHESIA POSTPROCEDURE EVALUATION
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          Post Anesthesia Care and Evaluation    Patient location during evaluation: bedside  Pain management: adequate  Airway patency: patent  Anesthetic complications: No anesthetic complications    Cardiovascular status: acceptable  Respiratory status: acceptable  Hydration status: acceptable

## 2020-11-23 ENCOUNTER — TELEPHONE (OUTPATIENT)
Dept: GASTROENTEROLOGY | Facility: CLINIC | Age: 50
End: 2020-11-23

## 2020-11-23 DIAGNOSIS — D64.9 ANEMIA, UNSPECIFIED TYPE: Primary | ICD-10-CM

## 2020-11-23 NOTE — TELEPHONE ENCOUNTER
[facility] was [facility] She was transfused with three units of blood, states her hgb was 5. States she needs a colonoscopy to evaluate anemia. Please advise.

## 2020-11-24 PROCEDURE — 99283 EMERGENCY DEPT VISIT LOW MDM: CPT

## 2020-11-24 NOTE — TELEPHONE ENCOUNTER
Called and spoke to patient. States she is having black tarry stools since she was discharged from the hospital yesterday. At discharge yesterday she states her HGB was 8.3.  No vomiting but states she has a lot of indigestion and heartburn.

## 2020-11-24 NOTE — TELEPHONE ENCOUNTER
APRN contacted patient to obtain further information regarding symptoms.  Patient was admitted to [facility] on 11/20/2020 with weakness and low blood pressure.  She was found to be severely anemic and received a total of 3 units of PRBCs during her admission.  Her most recent hemoglobin was 8.3 on 11/23/2020 and she was found to be fecal occult blood positive on 11/20/2020.  Patient deferred endoscopic work-up during her admission at [facility], as she preferred for Dr. Alexander to perform her procedures.  She was discharged home.    She denies any further weakness and states that she is feeling okay, but has concerns over her black tarry stools.  She reports having multiple large bowel movements yesterday that were black and tarry.  Patient underwent ERCP with Dr. Land on 11/13/2020 and biliary sphincterotomy was performed.  Due to concerns over melena and her recent episodes of anemia status post ERCP with biliary sphincterotomy patient was instructed to proceed to the ER at [facility] for further evaluation.  Patient verbalized understanding.  BETO contacted the ER at [facility] and spoke with Maycol to provide the above information.  Maycol verbalized understanding.  We will plan to follow-up with patient and will review records following her ER visit.  BETO Dong

## 2020-11-25 ENCOUNTER — HOSPITAL ENCOUNTER (EMERGENCY)
Facility: HOSPITAL | Age: 50
Discharge: HOME | End: 2020-11-25
Attending: EMERGENCY MEDICINE | Admitting: EMERGENCY MEDICINE

## 2020-11-25 VITALS
OXYGEN SATURATION: 94 % | WEIGHT: 199 LBS | TEMPERATURE: 97.5 F | BODY MASS INDEX: 37.57 KG/M2 | HEIGHT: 61 IN | RESPIRATION RATE: 22 BRPM | DIASTOLIC BLOOD PRESSURE: 85 MMHG | HEART RATE: 105 BPM | SYSTOLIC BLOOD PRESSURE: 116 MMHG

## 2020-11-25 DIAGNOSIS — Z87.19 HISTORY OF GI BLEED: ICD-10-CM

## 2020-11-25 DIAGNOSIS — D64.9 ANEMIA, UNSPECIFIED TYPE: Primary | ICD-10-CM

## 2020-11-25 LAB
ABO GROUP BLD: NORMAL
ALBUMIN SERPL-MCNC: 3.7 G/DL (ref 3.5–5.2)
ALBUMIN/GLOB SERPL: 1 G/DL
ALP SERPL-CCNC: 140 U/L (ref 39–117)
ALT SERPL W P-5'-P-CCNC: 19 U/L (ref 1–33)
ANION GAP SERPL CALCULATED.3IONS-SCNC: 7.6 MMOL/L (ref 5–15)
AST SERPL-CCNC: 17 U/L (ref 1–32)
BASOPHILS # BLD AUTO: 0.04 10*3/MM3 (ref 0–0.2)
BASOPHILS NFR BLD AUTO: 0.6 % (ref 0–1.5)
BILIRUB SERPL-MCNC: 0.3 MG/DL (ref 0–1.2)
BLD GP AB SCN SERPL QL: NEGATIVE
BUN SERPL-MCNC: 11 MG/DL (ref 6–20)
BUN/CREAT SERPL: 14.9 (ref 7–25)
CALCIUM SPEC-SCNC: 9 MG/DL (ref 8.6–10.5)
CHLORIDE SERPL-SCNC: 100 MMOL/L (ref 98–107)
CO2 SERPL-SCNC: 30.4 MMOL/L (ref 22–29)
CREAT SERPL-MCNC: 0.74 MG/DL (ref 0.57–1)
DEPRECATED RDW RBC AUTO: 57.6 FL (ref 37–54)
EOSINOPHIL # BLD AUTO: 0.27 10*3/MM3 (ref 0–0.4)
EOSINOPHIL NFR BLD AUTO: 4.1 % (ref 0.3–6.2)
ERYTHROCYTE [DISTWIDTH] IN BLOOD BY AUTOMATED COUNT: 20.2 % (ref 12.3–15.4)
GFR SERPL CREATININE-BSD FRML MDRD: 83 ML/MIN/1.73
GLOBULIN UR ELPH-MCNC: 3.6 GM/DL
GLUCOSE SERPL-MCNC: 124 MG/DL (ref 65–99)
HCT VFR BLD AUTO: 27.7 % (ref 34–46.6)
HGB BLD-MCNC: 8.6 G/DL (ref 12–15.9)
IMM GRANULOCYTES # BLD AUTO: 0.02 10*3/MM3 (ref 0–0.05)
IMM GRANULOCYTES NFR BLD AUTO: 0.3 % (ref 0–0.5)
LYMPHOCYTES # BLD AUTO: 1.35 10*3/MM3 (ref 0.7–3.1)
LYMPHOCYTES NFR BLD AUTO: 20.3 % (ref 19.6–45.3)
MCH RBC QN AUTO: 25.1 PG (ref 26.6–33)
MCHC RBC AUTO-ENTMCNC: 31 G/DL (ref 31.5–35.7)
MCV RBC AUTO: 80.8 FL (ref 79–97)
MONOCYTES # BLD AUTO: 0.54 10*3/MM3 (ref 0.1–0.9)
MONOCYTES NFR BLD AUTO: 8.1 % (ref 5–12)
NEUTROPHILS NFR BLD AUTO: 4.42 10*3/MM3 (ref 1.7–7)
NEUTROPHILS NFR BLD AUTO: 66.6 % (ref 42.7–76)
NRBC BLD AUTO-RTO: 0 /100 WBC (ref 0–0.2)
PLATELET # BLD AUTO: 321 10*3/MM3 (ref 140–450)
PMV BLD AUTO: 10 FL (ref 6–12)
POTASSIUM SERPL-SCNC: 3.8 MMOL/L (ref 3.5–5.2)
PROT SERPL-MCNC: 7.3 G/DL (ref 6–8.5)
RBC # BLD AUTO: 3.43 10*6/MM3 (ref 3.77–5.28)
RH BLD: POSITIVE
SODIUM SERPL-SCNC: 138 MMOL/L (ref 136–145)
T&S EXPIRATION DATE: NORMAL
WBC # BLD AUTO: 6.64 10*3/MM3 (ref 3.4–10.8)

## 2020-11-25 PROCEDURE — 80053 COMPREHEN METABOLIC PANEL: CPT | Performed by: EMERGENCY MEDICINE

## 2020-11-25 PROCEDURE — 86850 RBC ANTIBODY SCREEN: CPT | Performed by: EMERGENCY MEDICINE

## 2020-11-25 PROCEDURE — 86900 BLOOD TYPING SEROLOGIC ABO: CPT | Performed by: EMERGENCY MEDICINE

## 2020-11-25 PROCEDURE — 86901 BLOOD TYPING SEROLOGIC RH(D): CPT | Performed by: EMERGENCY MEDICINE

## 2020-11-25 PROCEDURE — 85025 COMPLETE CBC W/AUTO DIFF WBC: CPT | Performed by: EMERGENCY MEDICINE

## 2020-12-02 ENCOUNTER — LAB (OUTPATIENT)
Dept: LAB | Facility: HOSPITAL | Age: 50
End: 2020-12-02

## 2020-12-02 ENCOUNTER — OFFICE VISIT (OUTPATIENT)
Dept: GASTROENTEROLOGY | Facility: CLINIC | Age: 50
End: 2020-12-02

## 2020-12-02 VITALS
OXYGEN SATURATION: 98 % | SYSTOLIC BLOOD PRESSURE: 124 MMHG | BODY MASS INDEX: 38.63 KG/M2 | WEIGHT: 204.6 LBS | HEART RATE: 104 BPM | DIASTOLIC BLOOD PRESSURE: 82 MMHG | TEMPERATURE: 98 F | HEIGHT: 61 IN

## 2020-12-02 DIAGNOSIS — D64.9 ANEMIA, UNSPECIFIED TYPE: Primary | ICD-10-CM

## 2020-12-02 DIAGNOSIS — K21.9 GASTROESOPHAGEAL REFLUX DISEASE, UNSPECIFIED WHETHER ESOPHAGITIS PRESENT: ICD-10-CM

## 2020-12-02 DIAGNOSIS — K59.00 CONSTIPATION, UNSPECIFIED CONSTIPATION TYPE: ICD-10-CM

## 2020-12-02 DIAGNOSIS — R13.10 DYSPHAGIA, UNSPECIFIED TYPE: ICD-10-CM

## 2020-12-02 LAB
FERRITIN SERPL-MCNC: 32.2 NG/ML (ref 13–150)
HCT VFR BLD AUTO: 32.7 % (ref 34–46.6)
HGB BLD-MCNC: 9.4 G/DL (ref 12–15.9)
IRON 24H UR-MRATE: 23 MCG/DL (ref 37–145)
IRON SATN MFR SERPL: 4 % (ref 20–50)
TIBC SERPL-MCNC: 535 MCG/DL (ref 298–536)
TRANSFERRIN SERPL-MCNC: 359 MG/DL (ref 200–360)

## 2020-12-02 PROCEDURE — 85014 HEMATOCRIT: CPT | Performed by: INTERNAL MEDICINE

## 2020-12-02 PROCEDURE — 99214 OFFICE O/P EST MOD 30 MIN: CPT | Performed by: NURSE PRACTITIONER

## 2020-12-02 PROCEDURE — 82728 ASSAY OF FERRITIN: CPT | Performed by: NURSE PRACTITIONER

## 2020-12-02 PROCEDURE — 85018 HEMOGLOBIN: CPT | Performed by: INTERNAL MEDICINE

## 2020-12-02 PROCEDURE — 83540 ASSAY OF IRON: CPT | Performed by: NURSE PRACTITIONER

## 2020-12-02 PROCEDURE — 84466 ASSAY OF TRANSFERRIN: CPT | Performed by: NURSE PRACTITIONER

## 2020-12-02 PROCEDURE — 36415 COLL VENOUS BLD VENIPUNCTURE: CPT | Performed by: INTERNAL MEDICINE

## 2020-12-02 RX ORDER — DOCUSATE SODIUM 100 MG/1
100 CAPSULE, LIQUID FILLED ORAL 2 TIMES DAILY
Qty: 180 CAPSULE | Refills: 3 | Status: SHIPPED | OUTPATIENT
Start: 2020-12-02 | End: 2021-12-02

## 2020-12-02 RX ORDER — PLECANATIDE 3 MG/1
3 TABLET ORAL DAILY
Qty: 90 TABLET | Refills: 3 | Status: SHIPPED | OUTPATIENT
Start: 2020-12-02 | End: 2021-02-18

## 2020-12-02 RX ORDER — NALOXEGOL OXALATE 25 MG/1
25 TABLET, FILM COATED ORAL EVERY MORNING
Qty: 90 TABLET | Refills: 3 | Status: SHIPPED | OUTPATIENT
Start: 2020-12-02 | End: 2021-02-18

## 2020-12-02 RX ORDER — LANSOPRAZOLE 30 MG/1
30 CAPSULE, DELAYED RELEASE ORAL 2 TIMES DAILY
Qty: 180 CAPSULE | Refills: 3 | Status: SHIPPED | OUTPATIENT
Start: 2020-12-02 | End: 2021-02-18 | Stop reason: SDUPTHER

## 2020-12-02 NOTE — PROGRESS NOTES
Follow-up (post ER visit)      HPI  50-year-old female presents the office today for follow-up after an ED visit on 11/25/2020 for anemia and GI bleeding.  She had previously been admitted to [facility] with a GI bleed and was offered to undergo endoscopic evaluation, but deferred.  She contacted the office after her discharge complaining of melena and was referred back to the ER.  On 11/25/2020 in the ED, her H/H was 8.6/27.7%.  She had previously undergone an ERCP with Dr. Land on 11/13/2020 wherein a biliary sphincterotomy was performed, which was likely the source of her GI bleeding.    She continues oral iron therapy 1 tablet daily and denies having any further melena.  She denies any bright red blood per rectum.  She continues to report constipation, but states that as long as she takes her Trulance 3 mg daily and Movantik 25 mg daily in conjunction with Colace 100 mg twice daily and intermittent use of MiraLAX that she can have a bowel movement daily.  If she does not utilize this regimen, she goes greater than 1 week between BMs.  She is due for a colonoscopy.  Her last colonoscopy was performed on 2/21/2019 with Dr. Alexander revealing congested, erythematous, and granular mucosa from the descending colon to the rectum.  Nonbleeding internal hemorrhoids were also noted.  Pathology report is not available, but we will request.    She reports worsening GERD and is currently taking lansoprazole 30 mg before bedtime.  She cannot identify any specific foods that seem to worsen symptoms.  She denies NSAID use and alcohol use.  Symptoms generally occur in the evening hours, but do not occur through the night.  Symptoms occur daily.  She reports intermittent nausea, and states that Zofran works well to manage symptoms.    She reports dysphagia with foods and occasionally with medications.  He states that the type of food does not seem to make any impact in her symptoms.  Symptoms do not occur daily.  She denies  any regurgitation or dysphagia to liquids.        Review of Systems   Constitutional: Positive for fatigue and unexpected weight change. Negative for appetite change, chills, diaphoresis and fever.   HENT: Negative for dental problem, ear pain, mouth sores, rhinorrhea, sore throat and voice change.    Eyes: Negative for pain, redness and visual disturbance.   Respiratory: Positive for wheezing. Negative for cough and chest tightness.    Cardiovascular: Positive for leg swelling. Negative for chest pain and palpitations.   Endocrine: Positive for cold intolerance and heat intolerance. Negative for polydipsia, polyphagia and polyuria.   Genitourinary: Negative for dysuria, frequency, hematuria and urgency.   Musculoskeletal: Positive for arthralgias, back pain, joint swelling, myalgias and neck pain.   Skin: Negative for rash.   Allergic/Immunologic: Positive for food allergies. Negative for environmental allergies and immunocompromised state.   Neurological: Negative for dizziness, seizures, weakness, numbness and headaches.   Hematological: Does not bruise/bleed easily.   Psychiatric/Behavioral: Positive for sleep disturbance. The patient is not nervous/anxious.             Problem List:    Patient Active Problem List   Diagnosis   • Ampullary stenosis       Medical History:    Past Medical History:   Diagnosis Date   • Abdominal pain    • Abscess    • Allergic reaction    • Arthritis    • Asthma    • Baker's cyst    • Bloating    • C. difficile colitis    • Cellulitis of arm    • Community acquired pneumonia    • Conjunctivitis    • Constipation    • Current every day smoker     smokes less than 1 pack per day for 30 years   • Depression    • Dilated bile duct    • Dysphagia    • Early satiety    • Excessive daytime sleepiness    • Fibromyalgia    • Flu-like symptoms    • Gastritis    • Gastroesophageal reflux disease    • GERD (gastroesophageal reflux disease)    • Hand swelling    • High blood pressure    • Hip  pain    • History of depression    • History of kidney stones    • Hives    • Hypertension    • IBS (irritable bowel syndrome)    • Influenza A    • Insomnia    • Left shoulder pain    • Leg pain    • Leg swelling    • Low back pain    • Lupus (CMS/HCC)    • Major depression    • NSAID long-term use    • Onychomycosis of toenail    • Palpitations    • Pancreatic cyst    • Peptic ulcer    • RA (rheumatoid arthritis) (CMS/HCC)    • Rheumatoid factor positive    • Screening for colon cancer    • Shoulder injury    • Sleep apnea    • Sweating increase    • Synovitis of wrist    • Therapeutic opioid induced constipation    • Weight loss    • Wrist pain         Social History:    Social History     Socioeconomic History   • Marital status:      Spouse name: Not on file   • Number of children: Not on file   • Years of education: Not on file   • Highest education level: Not on file   Tobacco Use   • Smoking status: Current Every Day Smoker   • Smokeless tobacco: Never Used   • Tobacco comment: smokes less than 1 pack per day for 30 years   Substance and Sexual Activity   • Alcohol use: Not Currently     Comment: drank alcohol in the past, 7 or  less drinks per week   • Drug use: Not Currently     Comment: in the past   • Sexual activity: Defer       Family History:   Family History   Problem Relation Age of Onset   • Colon polyps Mother    • Colon polyps Father    • Crohn's disease Sister    • Colon cancer Maternal Grandmother    • Colon cancer Maternal Grandfather    • Arthritis Other    • Crohn's disease Other    • Other Other         gastroparesis   • COPD Other    • Colon cancer Other        Surgical History:   Past Surgical History:   Procedure Laterality Date   • ABDOMINAL SURGERY     • APPENDECTOMY     • BREAST SURGERY      Enlargement procedure   •  SECTION     • COLONOSCOPY  2019    Dr. Alexander   • ERCP N/A 2020    Procedure: ENDOSCOPIC RETROGRADE CHOLANGIOPANCREATOGRAPHY WITH  CHOLANGIOGRAM, SPHINCTEROTOMY AND BALLOON SWEEP;  Surgeon: Wesley Land MD;  Location: [facility] ENDOSCOPY;  Service: Gastroenterology;  Laterality: N/A;  PRE: AMPULLARY STENOSIS  POST: SAME   • HYSTERECTOMY      Not due to cancer   • KNEE SURGERY Left    • NEUROPLASTY      Neuroplasty Median Nerve at Carpal Tunnel   • REPLACEMENT TOTAL KNEE     • TONSILLECTOMY     • UPPER GASTROINTESTINAL ENDOSCOPY  08/22/2019    Dr. Alexander   • WRIST SURGERY           Current Outpatient Medications:   •  Abatacept 125 MG/ML solution auto-injector, Inject 125 mg under the skin into the appropriate area as directed Every 7 (Seven) Days., Disp: , Rfl:   •  albuterol sulfate HFA (Ventolin HFA) 108 (90 Base) MCG/ACT inhaler, Inhale 2 puffs., Disp: , Rfl:   •  amLODIPine (NORVASC) 10 MG tablet, Take 10 mg by mouth Daily., Disp: , Rfl:   •  Aspirin Buf,CaCarb-MgCarb-MgO, 81 MG tablet, Take 81 mg by mouth., Disp: , Rfl:   •  bisacodyl (Bisacodyl Laxative) 10 MG suppository, Insert 1 suppository into the rectum Daily., Disp: 28 suppository, Rfl: 2  •  chlorthalidone (HYGROTON) 25 MG tablet, Take 25 mg by mouth Daily., Disp: , Rfl:   •  Cholecalciferol (VITAMIN D) 50 MCG (2000 UT) tablet, Take 2,000 Units by mouth Daily., Disp: , Rfl:   •  Ciprofloxacin-Ciproflox HCl ER (Cipro XR) 1000 MG tablet sustained-release 24 hour, Take 100 mg by mouth 2 (two) times a day., Disp: , Rfl:   •  docusate sodium (COLACE) 100 MG capsule, Take 1 capsule by mouth 2 (Two) Times a Day., Disp: 180 capsule, Rfl: 3  •  DULoxetine (CYMBALTA) 30 MG capsule, , Disp: , Rfl:   •  DULoxetine (CYMBALTA) 60 MG capsule, Take 60 mg by mouth Daily., Disp: , Rfl:   •  ferrous sulfate 325 (65 FE) MG tablet, Take 1 tablet by mouth Daily With Breakfast., Disp: 30 tablet, Rfl: 3  •  folic acid (FOLVITE) 1 MG tablet, Take 1 mg by mouth Daily., Disp: , Rfl:   •  gabapentin (NEURONTIN) 300 MG capsule, , Disp: , Rfl:   •  lansoprazole (PREVACID) 30 MG capsule, Take 1 capsule by  mouth 2 (two) times a day., Disp: 180 capsule, Rfl: 3  •  methadone (DOLOPHINE) 10 MG tablet, Take 90 mg by mouth Daily,, Disp: , Rfl:   •  Naloxegol Oxalate (Movantik) 25 MG tablet, Take 1 tablet by mouth Every Morning., Disp: 90 tablet, Rfl: 3  •  ondansetron (ZOFRAN) 4 MG tablet, , Disp: , Rfl:   •  Plecanatide (Trulance) 3 MG tablet, Take 1 tablet by mouth Daily., Disp: 90 tablet, Rfl: 3  •  potassium chloride (K-DUR,KLOR-CON) 20 MEQ CR tablet, Take 20 mEq by mouth 2 (Two) Times a Day., Disp: , Rfl:   •  predniSONE (DELTASONE) 5 MG tablet, Take 5 mg by mouth Daily., Disp: , Rfl:   •  propranolol (INDERAL) 10 MG tablet, Take 10 mg by mouth 3 (Three) Times a Day., Disp: , Rfl:   •  QUEtiapine (SEROquel) 100 MG tablet, Take 300 mg by mouth Every Night., Disp: , Rfl:   •  sucralfate (CARAFATE) 1 g tablet, Take 1 g by mouth 2 (Two) Times a Day., Disp: , Rfl:     Allergies:   Allergies   Allergen Reactions   • Banana Other (See Comments)   • Butorphanol Other (See Comments)   • Penicillins Hives   • Sulfa Antibiotics Hives     Pt reports she has had and tolerated PO bactrim before. Documentation listed in Epic from October 2018        The following portions of the patient's history were reviewed and updated as appropriate: allergies, current medications, past family history, past medical history, past social history, past surgical history and problem list.    Vitals:    12/02/20 1211   BP: 124/82   Pulse: 104   Temp: 98 °F (36.7 °C)   SpO2: 98%       Physical Exam  Vitals signs reviewed.   Constitutional:       Appearance: Normal appearance.   Pulmonary:      Effort: Pulmonary effort is normal. No respiratory distress.   Abdominal:      General: Abdomen is flat. Bowel sounds are normal. There is no distension.      Palpations: Abdomen is soft.      Tenderness: There is no abdominal tenderness. There is no guarding or rebound.   Musculoskeletal: Normal range of motion.   Skin:     General: Skin is warm and dry.    Neurological:      General: No focal deficit present.      Mental Status: She is alert and oriented to person, place, and time.   Psychiatric:         Mood and Affect: Mood normal.         Behavior: Behavior normal.         Thought Content: Thought content normal.         Judgment: Judgment normal.         Assessment/ Plan  Diagnoses and all orders for this visit:    1. Anemia, unspecified type (Primary)  -     Iron Profile  -     Ferritin    2. Dysphagia, unspecified type  -     FL Esophagram Complete Double-Contrast; Future    3. Gastroesophageal reflux disease, unspecified whether esophagitis present    4. Constipation, unspecified constipation type    Other orders  -     Plecanatide (Trulance) 3 MG tablet; Take 1 tablet by mouth Daily.  Dispense: 90 tablet; Refill: 3  -     Naloxegol Oxalate (Movantik) 25 MG tablet; Take 1 tablet by mouth Every Morning.  Dispense: 90 tablet; Refill: 3  -     docusate sodium (COLACE) 100 MG capsule; Take 1 capsule by mouth 2 (Two) Times a Day.  Dispense: 180 capsule; Refill: 3  -     lansoprazole (PREVACID) 30 MG capsule; Take 1 capsule by mouth 2 (two) times a day.  Dispense: 180 capsule; Refill: 3         Return in about 8 weeks (around 1/27/2021).    1.  For worsening GERD, we will increase your lansoprazole to 30 mg twice daily.  We recommend that you take this medication 1 hour before your first and last meals of the day.  A new prescription has been sent to your pharmacy.    2.  For difficulty swallowing, we will obtain an esophagram.  Please be sure to eat slowly and chew your food thoroughly.    3.  For anemia, we will recheck a H/H today along with an iron profile and ferritin level.  We will contact you with results once available.    4.  For constipation, continue Movantik and Trulance.  Refills have been sent to your pharmacy.    5.  Additionally for constipation continue Colace stool softener, take 1 tab twice daily.  Refills have been sent to your pharmacy.    6.   You may also use MiraLAX if needed for management of constipation and titrate accordingly.    7.  Continue oral iron therapy.    8.  We will plan for follow-up office visit in 8 weeks for reassessment of symptoms, or sooner should symptoms worsen or fail to improve.    Discussion:  Worsening GERD, we will increase the patient's lansoprazole to 30 mg twice daily and have recommended that she take this an hour before first and last meals of the day.  We also recommended that she implement antireflux precautions.    For further evaluation of aphasia, we will schedule an esophagram.  Patient may require EGD with dilation pending results.    For anemia, we will obtain some lab work today for reassessment.  Patient to continue oral iron therapy.  Melena has resolved and patient denies any bright red blood per rectum.    For constipation, patient to continue Movantik and Trulance in conjunction with Colace stool softener 1 tablet twice daily and intermittent use of MiraLAX.    We will plan for next office visit in 8 weeks for reassessment of symptoms, or sooner should symptoms worsen or fail to improve.  Patient is due for colonoscopy, but we will await findings from her esophagram to determine if both procedures can be done at the same time.  Patient verbalized understand of above.  All questions answered and support provided.

## 2020-12-02 NOTE — PATIENT INSTRUCTIONS
1.  For worsening GERD, we will increase your lansoprazole to 30 mg twice daily.  We recommend that you take this medication 1 hour before your first and last meals of the day.  A new prescription has been sent to your pharmacy.    2.  For difficulty swallowing, we will obtain an esophagram.  Please be sure to eat slowly and chew your food thoroughly.    3.  For anemia, we will recheck a H/H today along with an iron profile and ferritin level.  We will contact you with results once available.    4.  For constipation, continue Movantik and Trulance.  Refills have been sent to your pharmacy.    5.  Additionally for constipation continue Colace stool softener, take 1 tab twice daily.  Refills have been sent to your pharmacy.    6.  You may also use MiraLAX if needed for management of constipation and titrate accordingly.    7.  Continue oral iron therapy.    8.  We will plan for follow-up office visit in 8 weeks for reassessment of symptoms, or sooner should symptoms worsen or fail to improve.

## 2020-12-03 ENCOUNTER — TRANSCRIBE ORDERS (OUTPATIENT)
Dept: LAB | Facility: HOSPITAL | Age: 50
End: 2020-12-03

## 2020-12-03 DIAGNOSIS — Z01.818 OTHER SPECIFIED PRE-OPERATIVE EXAMINATION: Primary | ICD-10-CM

## 2020-12-05 ENCOUNTER — RESULTS ENCOUNTER (OUTPATIENT)
Dept: GASTROENTEROLOGY | Facility: CLINIC | Age: 50
End: 2020-12-05

## 2020-12-05 DIAGNOSIS — D64.9 ANEMIA, UNSPECIFIED TYPE: ICD-10-CM

## 2020-12-09 ENCOUNTER — LAB (OUTPATIENT)
Dept: LAB | Facility: HOSPITAL | Age: 50
End: 2020-12-09

## 2020-12-09 DIAGNOSIS — Z01.818 OTHER SPECIFIED PRE-OPERATIVE EXAMINATION: ICD-10-CM

## 2020-12-09 PROCEDURE — C9803 HOPD COVID-19 SPEC COLLECT: HCPCS

## 2020-12-09 PROCEDURE — U0004 COV-19 TEST NON-CDC HGH THRU: HCPCS

## 2020-12-10 LAB — SARS-COV-2 RNA RESP QL NAA+PROBE: NOT DETECTED

## 2020-12-11 ENCOUNTER — HOSPITAL ENCOUNTER (OUTPATIENT)
Dept: GENERAL RADIOLOGY | Facility: HOSPITAL | Age: 50
Discharge: HOME | End: 2020-12-11
Admitting: NURSE PRACTITIONER

## 2020-12-11 DIAGNOSIS — R13.10 DYSPHAGIA, UNSPECIFIED TYPE: ICD-10-CM

## 2020-12-11 PROCEDURE — 74221 X-RAY XM ESOPHAGUS 2CNTRST: CPT

## 2020-12-11 RX ADMIN — BARIUM SULFATE 700 MG: 700 TABLET ORAL at 10:31

## 2020-12-11 RX ADMIN — ANTACID/ANTIFLATULENT 1 TABLET: 380; 550; 10; 10 GRANULE, EFFERVESCENT ORAL at 10:31

## 2020-12-11 RX ADMIN — BARIUM SULFATE 183 ML: 960 POWDER, FOR SUSPENSION ORAL at 10:31

## 2020-12-11 RX ADMIN — BARIUM SULFATE 135 ML: 980 POWDER, FOR SUSPENSION ORAL at 10:31

## 2020-12-22 ENCOUNTER — TELEPHONE (OUTPATIENT)
Dept: GASTROENTEROLOGY | Facility: CLINIC | Age: 50
End: 2020-12-22

## 2020-12-22 NOTE — TELEPHONE ENCOUNTER
Patient called stating that she is unaware if she is scheduling her CLS now, or after the 1st of the year. Please advise.     Zee

## 2020-12-23 NOTE — TELEPHONE ENCOUNTER
Called patient.  Reminded her that her scopes were rescheduled to 2/1.  She then remembered and I let her know that everything was mailed but unfortunately the mail is running slow right now.

## 2021-01-25 RX ORDER — PROMETHAZINE HYDROCHLORIDE 25 MG/1
25 TABLET ORAL EVERY 6 HOURS PRN
COMMUNITY

## 2021-01-27 ENCOUNTER — TRANSCRIBE ORDERS (OUTPATIENT)
Dept: LAB | Facility: SURGERY CENTER | Age: 51
End: 2021-01-27

## 2021-01-27 DIAGNOSIS — Z01.818 OTHER SPECIFIED PRE-OPERATIVE EXAMINATION: Primary | ICD-10-CM

## 2021-01-29 ENCOUNTER — APPOINTMENT (OUTPATIENT)
Dept: LAB | Facility: SURGERY CENTER | Age: 51
End: 2021-01-29

## 2021-01-29 ENCOUNTER — LAB (OUTPATIENT)
Dept: LAB | Facility: SURGERY CENTER | Age: 51
End: 2021-01-29

## 2021-01-29 DIAGNOSIS — Z01.818 OTHER SPECIFIED PRE-OPERATIVE EXAMINATION: ICD-10-CM

## 2021-01-29 PROCEDURE — C9803 HOPD COVID-19 SPEC COLLECT: HCPCS

## 2021-01-29 PROCEDURE — U0004 COV-19 TEST NON-CDC HGH THRU: HCPCS | Performed by: SURGERY

## 2021-01-30 ENCOUNTER — APPOINTMENT (OUTPATIENT)
Dept: LAB | Facility: SURGERY CENTER | Age: 51
End: 2021-01-30

## 2021-01-30 LAB — SARS-COV-2 RNA RESP QL NAA+PROBE: NOT DETECTED

## 2021-02-01 ENCOUNTER — ANESTHESIA EVENT (OUTPATIENT)
Dept: SURGERY | Facility: SURGERY CENTER | Age: 51
End: 2021-02-01

## 2021-02-01 ENCOUNTER — TRANSCRIBE ORDERS (OUTPATIENT)
Dept: ADMINISTRATIVE | Facility: HOSPITAL | Age: 51
End: 2021-02-01

## 2021-02-01 ENCOUNTER — ANESTHESIA (OUTPATIENT)
Dept: SURGERY | Facility: SURGERY CENTER | Age: 51
End: 2021-02-01

## 2021-02-01 ENCOUNTER — LAB (OUTPATIENT)
Dept: LAB | Facility: HOSPITAL | Age: 51
End: 2021-02-01

## 2021-02-01 ENCOUNTER — OUTSIDE FACILITY SERVICE (OUTPATIENT)
Dept: GASTROENTEROLOGY | Facility: CLINIC | Age: 51
End: 2021-02-01

## 2021-02-01 ENCOUNTER — HOSPITAL ENCOUNTER (OUTPATIENT)
Facility: SURGERY CENTER | Age: 51
Setting detail: HOSPITAL OUTPATIENT SURGERY
Discharge: HOME | End: 2021-02-01
Attending: INTERNAL MEDICINE | Admitting: INTERNAL MEDICINE

## 2021-02-01 VITALS
TEMPERATURE: 97 F | SYSTOLIC BLOOD PRESSURE: 108 MMHG | HEIGHT: 61 IN | OXYGEN SATURATION: 92 % | DIASTOLIC BLOOD PRESSURE: 73 MMHG | HEART RATE: 63 BPM | WEIGHT: 195 LBS | RESPIRATION RATE: 16 BRPM | BODY MASS INDEX: 36.82 KG/M2

## 2021-02-01 DIAGNOSIS — D64.9 ANEMIA, UNSPECIFIED: ICD-10-CM

## 2021-02-01 DIAGNOSIS — D58.8: Primary | ICD-10-CM

## 2021-02-01 LAB — GIE STN SPEC: ABNORMAL

## 2021-02-01 PROCEDURE — 87206 SMEAR FLUORESCENT/ACID STAI: CPT | Performed by: INTERNAL MEDICINE

## 2021-02-01 PROCEDURE — 0DB58ZZ EXCISION OF ESOPHAGUS, VIA NATURAL OR ARTIFICIAL OPENING ENDOSCOPIC: ICD-10-PCS | Performed by: INTERNAL MEDICINE

## 2021-02-01 PROCEDURE — 43239 EGD BIOPSY SINGLE/MULTIPLE: CPT | Performed by: INTERNAL MEDICINE

## 2021-02-01 PROCEDURE — 43249 ESOPH EGD DILATION <30 MM: CPT | Performed by: INTERNAL MEDICINE

## 2021-02-01 PROCEDURE — 25010000002 PROPOFOL 10 MG/ML EMULSION: Performed by: ANESTHESIOLOGY

## 2021-02-01 PROCEDURE — G0121 COLON CA SCRN NOT HI RSK IND: HCPCS | Performed by: INTERNAL MEDICINE

## 2021-02-01 PROCEDURE — 45378 DIAGNOSTIC COLONOSCOPY: CPT | Performed by: INTERNAL MEDICINE

## 2021-02-01 PROCEDURE — C1726 CATH, BAL DIL, NON-VASCULAR: HCPCS | Performed by: INTERNAL MEDICINE

## 2021-02-01 PROCEDURE — 88305 TISSUE EXAM BY PATHOLOGIST: CPT | Performed by: INTERNAL MEDICINE

## 2021-02-01 RX ORDER — SODIUM CHLORIDE 0.9 % (FLUSH) 0.9 %
10 SYRINGE (ML) INJECTION EVERY 12 HOURS SCHEDULED
Status: DISCONTINUED | OUTPATIENT
Start: 2021-02-01 | End: 2021-02-01 | Stop reason: HOSPADM

## 2021-02-01 RX ORDER — SODIUM CHLORIDE, SODIUM LACTATE, POTASSIUM CHLORIDE, CALCIUM CHLORIDE 600; 310; 30; 20 MG/100ML; MG/100ML; MG/100ML; MG/100ML
9 INJECTION, SOLUTION INTRAVENOUS CONTINUOUS PRN
Status: DISCONTINUED | OUTPATIENT
Start: 2021-02-01 | End: 2021-02-01 | Stop reason: HOSPADM

## 2021-02-01 RX ORDER — LIDOCAINE HYDROCHLORIDE 20 MG/ML
INJECTION, SOLUTION INFILTRATION; PERINEURAL AS NEEDED
Status: DISCONTINUED | OUTPATIENT
Start: 2021-02-01 | End: 2021-02-01 | Stop reason: SURG

## 2021-02-01 RX ORDER — HYDROXYCHLOROQUINE SULFATE 200 MG/1
200 TABLET, FILM COATED ORAL 2 TIMES DAILY
COMMUNITY

## 2021-02-01 RX ORDER — SODIUM CHLORIDE 0.9 % (FLUSH) 0.9 %
10 SYRINGE (ML) INJECTION AS NEEDED
Status: DISCONTINUED | OUTPATIENT
Start: 2021-02-01 | End: 2021-02-01 | Stop reason: HOSPADM

## 2021-02-01 RX ORDER — PROPOFOL 10 MG/ML
VIAL (ML) INTRAVENOUS AS NEEDED
Status: DISCONTINUED | OUTPATIENT
Start: 2021-02-01 | End: 2021-02-01 | Stop reason: SURG

## 2021-02-01 RX ORDER — PROPOFOL 10 MG/ML
VIAL (ML) INTRAVENOUS CONTINUOUS PRN
Status: DISCONTINUED | OUTPATIENT
Start: 2021-02-01 | End: 2021-02-01 | Stop reason: SURG

## 2021-02-01 RX ORDER — MAGNESIUM HYDROXIDE 1200 MG/15ML
LIQUID ORAL AS NEEDED
Status: DISCONTINUED | OUTPATIENT
Start: 2021-02-01 | End: 2021-02-01 | Stop reason: HOSPADM

## 2021-02-01 RX ORDER — ONDANSETRON 2 MG/ML
4 INJECTION INTRAMUSCULAR; INTRAVENOUS ONCE AS NEEDED
Status: CANCELLED | OUTPATIENT
Start: 2021-02-01

## 2021-02-01 RX ORDER — DIPHENHYDRAMINE HYDROCHLORIDE 50 MG/ML
12.5 INJECTION INTRAMUSCULAR; INTRAVENOUS
Status: CANCELLED | OUTPATIENT
Start: 2021-02-01

## 2021-02-01 RX ADMIN — SODIUM CHLORIDE, POTASSIUM CHLORIDE, SODIUM LACTATE AND CALCIUM CHLORIDE 9 ML/HR: 600; 310; 30; 20 INJECTION, SOLUTION INTRAVENOUS at 11:55

## 2021-02-01 RX ADMIN — FAMOTIDINE 20 MG: 10 INJECTION INTRAVENOUS at 11:56

## 2021-02-01 RX ADMIN — PROPOFOL 120 MG: 10 INJECTION, EMULSION INTRAVENOUS at 12:05

## 2021-02-01 RX ADMIN — LIDOCAINE HYDROCHLORIDE 50 MG: 20 INJECTION, SOLUTION INFILTRATION; PERINEURAL at 12:05

## 2021-02-01 RX ADMIN — PROPOFOL INJECTABLE EMULSION 180 MCG/KG/MIN: 10 INJECTION, EMULSION INTRAVENOUS at 12:05

## 2021-02-01 NOTE — ANESTHESIA POSTPROCEDURE EVALUATION
"Patient: Jazzy Diaz    Procedure Summary     Date: 02/01/21 Room / Location: [facility]    Anesthesia Start: 1205 Anesthesia Stop: 1253    Procedures:       ESOPHAGOGASTRODUODENOSCOPY WITH BIOPSY and with dilitation (N/A )      COLONOSCOPY (N/A ) Diagnosis:       Anemia, unspecified      (Anemia, unspecified [D64.9])    Surgeon: Benji Alexander MD Provider: Farnaz Herrera MD    Anesthesia Type: MAC ASA Status: 3          Anesthesia Type: MAC    Vitals  Vitals Value Taken Time   /89 02/01/21 1316   Temp 36.1 °C (97 °F) 02/01/21 1255   Pulse 71 02/01/21 1317   Resp 16 02/01/21 1309   SpO2 95 % 02/01/21 1317   Vitals shown include unvalidated device data.        Post Anesthesia Care and Evaluation    Patient location during evaluation: PHASE II  Patient participation: complete - patient participated  Level of consciousness: awake  Pain management: adequate  Airway patency: patent  Anesthetic complications: No anesthetic complications    Cardiovascular status: acceptable  Respiratory status: acceptable  Hydration status: acceptable    Comments: /73   Pulse 63   Temp 36.1 °C (97 °F) (Infrared)   Resp 16   Ht 154.9 cm (61\")   Wt 88.5 kg (195 lb)   SpO2 92%   BMI 36.84 kg/m²       "

## 2021-02-01 NOTE — ANESTHESIA PREPROCEDURE EVALUATION
Anesthesia Evaluation     Patient summary reviewed and Nursing notes reviewed   NPO Solid Status: > 8 hours  NPO Liquid Status: > 2 hours           Airway   Mallampati: II  Dental - normal exam   (+) edentulous    Pulmonary - normal exam   (+) a smoker Current Smoked day of surgery, asthma,sleep apnea,   Cardiovascular - normal exam    (+) hypertension,       Neuro/Psych  (+) psychiatric history Depression,     GI/Hepatic/Renal/Endo    (+) morbid obesity, GERD, PUD,      Musculoskeletal     (+) arthralgias,   Abdominal   (+) obese,    Substance History      OB/GYN          Other   arthritis, chronic steroid use      ROS/Med Hx Other: Lupus  Fibromyalgia                Anesthesia Plan    ASA 3     MAC

## 2021-02-02 LAB
CYTO UR: NORMAL
LAB AP CASE REPORT: NORMAL
PATH REPORT.FINAL DX SPEC: NORMAL
PATH REPORT.GROSS SPEC: NORMAL

## 2021-02-03 DIAGNOSIS — B37.81 CANDIDA INFECTION, ESOPHAGEAL (HCC): Primary | ICD-10-CM

## 2021-02-03 RX ORDER — FLUCONAZOLE 100 MG/1
TABLET ORAL
Qty: 23 TABLET | Refills: 0 | OUTPATIENT
Start: 2021-02-03

## 2021-02-18 ENCOUNTER — OFFICE VISIT (OUTPATIENT)
Dept: GASTROENTEROLOGY | Facility: CLINIC | Age: 51
End: 2021-02-18

## 2021-02-18 DIAGNOSIS — Z09 FOLLOW-UP EXAMINATION, FOLLOWING OTHER SURGERY: ICD-10-CM

## 2021-02-18 DIAGNOSIS — K21.9 GASTROESOPHAGEAL REFLUX DISEASE, UNSPECIFIED WHETHER ESOPHAGITIS PRESENT: Chronic | ICD-10-CM

## 2021-02-18 DIAGNOSIS — D64.9 ANEMIA, UNSPECIFIED TYPE: Primary | ICD-10-CM

## 2021-02-18 DIAGNOSIS — K59.03 DRUG-INDUCED CONSTIPATION: ICD-10-CM

## 2021-02-18 DIAGNOSIS — Z86.010 HX OF COLONIC POLYPS: ICD-10-CM

## 2021-02-18 PROCEDURE — 99443 PR PHYS/QHP TELEPHONE EVALUATION 21-30 MIN: CPT | Performed by: NURSE PRACTITIONER

## 2021-02-18 RX ORDER — LANSOPRAZOLE 30 MG/1
CAPSULE, DELAYED RELEASE ORAL
Qty: 180 CAPSULE | Refills: 3 | Status: SHIPPED | OUTPATIENT
Start: 2021-02-18 | End: 2022-01-28

## 2021-02-18 RX ORDER — METHYLNALTREXONE BROMIDE 150 MG/1
450 TABLET ORAL DAILY
Qty: 270 TABLET | Refills: 3 | Status: SHIPPED | OUTPATIENT
Start: 2021-02-18 | End: 2022-02-13

## 2021-02-18 NOTE — PROGRESS NOTES
Chief Complaint   Patient presents with   • Follow-up     After EGD and colonoscopy         History of Present Illness  50-year-old female presents today for telephone follow-up.  She was last seen in office on 12/2/2020.  She is a history of upper GI bleeding, anemia, biliary sphincterotomy, opioid-induced constipation, GERD, and dysphagia.    EGD and colonoscopy were performed on 2/1/2021.  EGD revealed a 2 cm hiatal hernia, esophageal plaques consistent with findings of Candida esophagitis, and chronic gastritis.  Poor bowel prep was noted on colonoscopy.  No specimens were collected.  Patient was noted to have hemorrhoids on perianal exam.    She reports worsening heartburn and reflux and is experiencing symptoms at least 3 times per day.  Symptoms are worse at night, but she states that she does eat close to bedtime.  She continues lansoprazole 30 mg once daily.  She is currently still completing treatment for Candida esophagitis with nystatin and does still experience some dysphagia, with a feeling that food gets stuck in her lower esophagus.  She feels that a water wash makes symptoms worse and she does experience excess gas.  She denies any regurgitation.  She denies any nausea or vomiting.    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  She is currently taking a regimen that consists of Movantik, bisacodyl, stool softener, and MiraLAX.  She reports having a bowel movement about every 9 days.  She also takes oral iron therapy secondary to her history of anemia.  She reports accompanying nausea with her constipation, but states that it is mild.  She reports some mild weight gain.  She does have a personal history of colon polyps.  She has undergone a barium enema in the past, but does not wish to proceed with this for additional work-up due to her poor colon prep.    You have chosen to receive care through a telephone visit.   Do you consent to use a telephone visit for  your medical care today? Yes    Review of Systems   HENT: Positive for trouble swallowing.    Cardiovascular: Negative for chest pain.   Gastrointestinal: Positive for abdominal pain, constipation and nausea. Negative for abdominal distention, anal bleeding, blood in stool, diarrhea and rectal pain.      Result Review :      UPPER GI ENDOSCOPY (02/01/2021 11:39)  COLONOSCOPY (02/01/2021 11:39)  Tissue Pathology Exam (02/01/2021 12:11)  Fungus Smear - Brushing, Esophagus (02/01/2021 12:16)    Assessment and Plan    Diagnoses and all orders for this visit:    1. Anemia, unspecified type (Primary)  -     CBC & Differential  -     Ferritin  -     Iron Profile  -     CT Virtual Colonoscopy Screening; Future    2. Drug-induced constipation    3. Follow-up examination, following other surgery  -     CT Virtual Colonoscopy Screening; Future    4. Gastroesophageal reflux disease, unspecified whether esophagitis present  Comments:  Worsening    5. Hx of colonic polyps  -     CT Virtual Colonoscopy Screening; Future    Other orders  -     lansoprazole (PREVACID) 30 MG capsule; Take 1 tab before first and last meals of the day  Dispense: 180 capsule; Refill: 3  -     Methylnaltrexone Bromide (Relistor) 150 MG tablet; Take 450 mg by mouth Daily for 360 days. Take all 3 tablets at the same time each morning  Dispense: 270 tablet; Refill: 3         This visit has been rescheduled as a phone visit to comply with patient safety concerns in accordance with CDC recommendations. Total time of discussion was 27 minutes.    Follow Up   Return in about 8 weeks (around 4/15/2021).    Patient Instructions   1.  For worsening heartburn and reflux, we will increase lansoprazole to 30 mg twice daily.  Recommend you take this medication 1 hour before your first and last meals of the day.  For GERD, we recommend avoiding eating 3-4 hours before bedtime, eating smaller more frequent meals, and avoiding any known food triggers including spicy  foods, tomatoes and tomato-based sauces, chocolate, coffee/tea, citrus fruits, carbonated  beverages and alcohol.     2.  For Candida esophagitis, please complete your treatment course of nystatin.  If any difficulty swallowing persists following treatment, please contact our office for further recommendations.    3.  For persistent opioid-induced constipation is not well managed with your current regimen, we will have you discontinue Movantik and will start Relistor.  With Relistor, you will take 3 tablets all at the same time each morning.  You may continue use of MiraLAX and titrate accordingly in conjunction with Relistor as well as continue use of your daily stool softener.  If this medication is cost prohibitive, please contact the office and additional recommendations will be made.    4.  For ongoing monitoring of anemia, we will check a CBC, ferritin level, and iron panel.  We will contact you with results once available.  Please continue oral iron therapy.    5.  Due to poor bowel prep on your most recent colonoscopy, we will schedule a virtual CT colonography for further evaluation.  You will be contacted to schedule this testing.  Once results have returned, we will contact you to discuss further.    6.  Plan for follow-up telephone visit in 8 weeks for reassessment of symptoms, or sooner should symptoms worsen or fail to improve.     Discussion:    EGD and colonoscopy findings and pathology reviewed with patient today during her office visit.  For worsening GERD and heartburn we will increase lansoprazole to 30 mg twice daily and have recommended the patient implement antireflux precautions.  For Candida esophagitis, patient to complete course of nystatin.  Should she continue to experience dysphagia following nystatin treatment, patient was instructed to contact the office for further recommendations.    For persistent constipation despite her aggressive regimen, we will discontinue Movantik and start  the patient on Relistor 450 mg once daily.  We will have her continue MiraLAX and her daily stool softener in conjunction with this regimen.  Patient was recommended to maintain adequate fluid intake. To consider use of Symproic if Relistor is not covered by her insurance company or becomes ineffective.    Due to the patient's poor bowel prep and history of colon polyps, we have placed an order for virtual CT colonography for further evaluation.  Patient deferred barium enema.  We will contact patient with results once available and make further recommendations at that time.  We will plan for telephone follow-up in 8 weeks for reassessment of symptoms, or sooner should symptoms worsen or fail to improve.  Patient verbalized understanding of above plan of care and is in agreement.  All questions answered and support provided.

## 2021-02-18 NOTE — PATIENT INSTRUCTIONS
1.  For worsening heartburn and reflux, we will increase lansoprazole to 30 mg twice daily.  Recommend you take this medication 1 hour before your first and last meals of the day.  For GERD, we recommend avoiding eating 3-4 hours before bedtime, eating smaller more frequent meals, and avoiding any known food triggers including spicy foods, tomatoes and tomato-based sauces, chocolate, coffee/tea, citrus fruits, carbonated  beverages and alcohol.     2.  For Candida esophagitis, please complete your treatment course of nystatin.  If any difficulty swallowing persists following treatment, please contact our office for further recommendations.    3.  For persistent opioid-induced constipation is not well managed with your current regimen, we will have you discontinue Movantik and will start Relistor.  With Relistor, you will take 3 tablets all at the same time each morning.  You may continue use of MiraLAX and titrate accordingly in conjunction with Relistor as well as continue use of your daily stool softener.  If this medication is cost prohibitive, please contact the office and additional recommendations will be made.    4.  For ongoing monitoring of anemia, we will check a CBC, ferritin level, and iron panel.  We will contact you with results once available.  Please continue oral iron therapy.    5.  Due to poor bowel prep on your most recent colonoscopy, we will schedule a virtual CT colonography for further evaluation.  You will be contacted to schedule this testing.  Once results have returned, we will contact you to discuss further.    6.  Plan for follow-up telephone visit in 8 weeks for reassessment of symptoms, or sooner should symptoms worsen or fail to improve.

## 2021-03-13 DIAGNOSIS — D64.9 ANEMIA, UNSPECIFIED TYPE: ICD-10-CM

## 2021-03-15 RX ORDER — FERROUS SULFATE 325(65) MG
TABLET ORAL
Qty: 30 TABLET | Refills: 2 | Status: SHIPPED | OUTPATIENT
Start: 2021-03-15 | End: 2021-06-30

## 2021-03-19 ENCOUNTER — TELEPHONE (OUTPATIENT)
Dept: GASTROENTEROLOGY | Facility: CLINIC | Age: 51
End: 2021-03-19

## 2021-03-19 NOTE — TELEPHONE ENCOUNTER
Called patient, she states she had overdue 2/18/21 labs done at [facility]. I called and requested labs to be faxed to our office.

## 2021-04-16 ENCOUNTER — TELEPHONE (OUTPATIENT)
Dept: GASTROENTEROLOGY | Facility: CLINIC | Age: 51
End: 2021-04-16

## 2021-04-16 NOTE — TELEPHONE ENCOUNTER
LM for patient about overdue labs for [facility]. I called [facility] again, they state they do not have completed labs on file. Lab order is still in system, but has not been done by the patient.

## 2021-06-29 DIAGNOSIS — D64.9 ANEMIA, UNSPECIFIED TYPE: ICD-10-CM

## 2021-06-30 RX ORDER — FERROUS SULFATE 325(65) MG
TABLET ORAL
Qty: 30 TABLET | Refills: 5 | Status: SHIPPED | OUTPATIENT
Start: 2021-06-30 | End: 2022-02-21

## 2022-01-28 RX ORDER — LANSOPRAZOLE 30 MG/1
CAPSULE, DELAYED RELEASE ORAL
Qty: 180 CAPSULE | Refills: 3 | Status: SHIPPED | OUTPATIENT
Start: 2022-01-28 | End: 2023-02-14 | Stop reason: SDUPTHER

## 2022-02-19 DIAGNOSIS — D64.9 ANEMIA, UNSPECIFIED TYPE: ICD-10-CM

## 2022-02-21 RX ORDER — FERROUS SULFATE 325(65) MG
TABLET ORAL
Qty: 30 TABLET | Refills: 5 | Status: SHIPPED | OUTPATIENT
Start: 2022-02-21 | End: 2022-09-06 | Stop reason: SDUPTHER

## 2022-09-06 DIAGNOSIS — D64.9 ANEMIA, UNSPECIFIED TYPE: ICD-10-CM

## 2022-09-06 RX ORDER — FERROUS SULFATE 325(65) MG
1 TABLET ORAL
Qty: 30 TABLET | Refills: 1 | Status: SHIPPED | OUTPATIENT
Start: 2022-09-06 | End: 2023-02-14 | Stop reason: SDUPTHER

## 2022-11-08 DIAGNOSIS — D64.9 ANEMIA, UNSPECIFIED TYPE: ICD-10-CM

## 2022-11-08 RX ORDER — FERROUS SULFATE 325(65) MG
TABLET ORAL
Qty: 30 TABLET | Refills: 1 | OUTPATIENT
Start: 2022-11-08

## 2023-02-14 ENCOUNTER — OFFICE VISIT (OUTPATIENT)
Dept: GASTROENTEROLOGY | Facility: CLINIC | Age: 53
End: 2023-02-14
Payer: MEDICARE

## 2023-02-14 VITALS
WEIGHT: 221.2 LBS | SYSTOLIC BLOOD PRESSURE: 120 MMHG | HEART RATE: 104 BPM | BODY MASS INDEX: 41.76 KG/M2 | HEIGHT: 61 IN | TEMPERATURE: 98 F | OXYGEN SATURATION: 98 % | DIASTOLIC BLOOD PRESSURE: 78 MMHG

## 2023-02-14 DIAGNOSIS — D64.9 ANEMIA, UNSPECIFIED TYPE: ICD-10-CM

## 2023-02-14 DIAGNOSIS — R13.10 ODYNOPHAGIA: ICD-10-CM

## 2023-02-14 DIAGNOSIS — R13.10 DYSPHAGIA, UNSPECIFIED TYPE: Primary | ICD-10-CM

## 2023-02-14 DIAGNOSIS — Z12.11 COLON CANCER SCREENING: ICD-10-CM

## 2023-02-14 PROCEDURE — 99214 OFFICE O/P EST MOD 30 MIN: CPT | Performed by: NURSE PRACTITIONER

## 2023-02-14 RX ORDER — LANSOPRAZOLE 30 MG/1
CAPSULE, DELAYED RELEASE ORAL
Qty: 180 CAPSULE | Refills: 3 | Status: SHIPPED | OUTPATIENT
Start: 2023-02-14

## 2023-02-14 RX ORDER — FERROUS SULFATE 325(65) MG
1 TABLET ORAL
Qty: 90 TABLET | Refills: 3 | Status: SHIPPED | OUTPATIENT
Start: 2023-02-14

## 2023-02-14 RX ORDER — FLUTICASONE FUROATE, UMECLIDINIUM BROMIDE AND VILANTEROL TRIFENATATE 100; 62.5; 25 UG/1; UG/1; UG/1
POWDER RESPIRATORY (INHALATION)
COMMUNITY
Start: 2023-01-03

## 2023-02-14 RX ORDER — FUROSEMIDE 40 MG/1
TABLET ORAL
COMMUNITY
Start: 2023-01-25

## 2023-02-14 RX ORDER — PSEUDOEPHEDRINE HCL 30 MG
100 TABLET ORAL 3 TIMES DAILY
COMMUNITY

## 2023-02-14 RX ORDER — METHOTREXATE 25 MG/ML
INJECTION, SOLUTION INTRA-ARTERIAL; INTRAMUSCULAR; INTRAVENOUS
COMMUNITY
Start: 2023-01-14

## 2023-02-14 RX ORDER — PRIMIDONE 50 MG/1
TABLET ORAL
COMMUNITY
Start: 2023-01-26

## 2023-02-14 NOTE — PATIENT INSTRUCTIONS
1.  For GERD, continue lansoprazole 30 mg twice daily.  Refills have been sent to your pharmacy.    2.  For further evaluation of difficulty swallowing we will schedule both an esophagram as well as esophageal manometry.  We will contact you with results once available.  You will be contacted to schedule these imaging test.    3.  We recommend that you follow swallowing precautions by chewing your food thoroughly, eating slowly, and remaining upright for meals.  We also recommend a water wash with meals.  We would recommend avoiding thick meats and breads, as these can be problematic.    4.  For constipation continue MiraLAX.  You may adjust your dosing accordingly based upon how your bowel habits respond.    5.  For colon cancer screening we will proceed with Cologuard testing.  Orders have been placed.  We will contact you with results once received.    6.  Next office follow-up to be determined based upon esophageal manometry finding and esophagram findings.    7.  Continue oral iron therapy.  Refill sent to your pharmacy.

## 2023-02-14 NOTE — PROGRESS NOTES
"Chief Complaint   Patient presents with   • Follow-up   • Nausea   • Heartburn   • Constipation         History of Present Illness  52-year-old female presents the office today for follow-up.  She was last seen in office on 2/18/2021.  She has a history of GERD, dysphagia, upper GI bleed, anemia, biliary sphincterotomy, and opioid-induced constipation.  Last EGD and colonoscopy were performed on 2/1/2021 with EGD revealing a 2 cm hiatal hernia, Candida esophagitis and chronic gastritis.Colonoscopy revealed a poor bowel prep.  No specimens were collected and patient was noted to have hemorrhoids on exam.    For GERD she continues lansoprazole 30 mg twice daily with pretty good control of symptoms. She reports acid reflux up into her nose at night, but now that she is sleeping with the HOB elevated, symptoms are much better.  She reports \"nausea that is unreal\". She uses phenergan twice daily. She denies emesis. She still has issues with swallowing and her food is still getting hung up midway down her esophagus that will eventually pass. She has had her esophagus stretched twice with no relief. She has not been worked up by motility as of yet. Esophagram on 12/11/2020 revealed a small sliding hiatal hernia and barium tablet stopped at the [facility] before going into her stomach. She feels like she is \"having a heart attack\" when swallowing food at times as she feels that it builds up in her esophagus.  Utility of esophageal manometry has been reviewed with patient previous visits, but not yet ordered.    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  She has previously taken Movantik, bisacodyl, stool softeners, Relistor, and MiraLAX.  She is currently taking 1 capful of MiraLax daily. Usually she will have regular BMs, which will then turn to diarrhea-then she holds the MiraLax. She feels her gastroparesis has something to do with her bowel habits. She reports having diarrhea this morning and " "has not taken MiraLax in 5 days. She denies any rectal bleeding or blood in her stool. She reports a history of colon cancer in her father. She denies any personal history of colon polyps. She has a ColoGuard kit at home that has not yet been submitted.      Review of Systems   Constitutional: Negative for fever and unexpected weight change.   HENT: Positive for trouble swallowing.    Cardiovascular: Negative for chest pain.   Gastrointestinal: Positive for constipation, diarrhea and nausea. Negative for abdominal distention, abdominal pain, anal bleeding, blood in stool, rectal pain and vomiting.         Result Review :       Office Visit with Yolanda Sprague APRN (02/18/2021)  UPPER GI ENDOSCOPY (02/01/2021 11:39)  COLONOSCOPY (02/01/2021 11:39)  ERCP (11/13/2020 10:36)  Tissue Pathology Exam (02/01/2021 12:11)  Fungus Smear - Brushing, Esophagus (02/01/2021 12:16)  FL Esophagram Complete Double-Contrast (12/11/2020 10:30)    Vital Signs:   /78   Pulse 104   Temp 98 °F (36.7 °C)   Ht 154.9 cm (61\")   Wt 100 kg (221 lb 3.2 oz)   SpO2 98%   BMI 41.80 kg/m²     Body mass index is 41.8 kg/m².     Physical Exam  Vitals reviewed.   Constitutional:       Appearance: Normal appearance.   Pulmonary:      Effort: Pulmonary effort is normal. No respiratory distress.   Abdominal:      General: Abdomen is flat. Bowel sounds are normal. There is no distension.      Palpations: Abdomen is soft. There is no mass.      Tenderness: There is no abdominal tenderness. There is no guarding.   Musculoskeletal:         General: Normal range of motion.   Skin:     General: Skin is warm and dry.   Neurological:      General: No focal deficit present.      Mental Status: She is alert and oriented to person, place, and time.   Psychiatric:         Mood and Affect: Mood normal.         Behavior: Behavior normal.         Thought Content: Thought content normal.         Judgment: Judgment normal.       Assessment and Plan  "   Diagnoses and all orders for this visit:    1. Dysphagia, unspecified type (Primary)  -     Manometry (esophageal)  -     FL Esophagram Complete Double-Contrast; Future    2. Odynophagia  -     Manometry (esophageal)  -     FL Esophagram Complete Double-Contrast; Future    3. Colon cancer screening  -     Cologuard - Stool, Per Rectum; Future    4. Anemia, unspecified type  -     ferrous sulfate (FeroSul) 325 (65 FE) MG tablet; Take 1 tablet by mouth Daily With Breakfast.  Dispense: 90 tablet; Refill: 3    Other orders  -     lansoprazole (PREVACID) 30 MG capsule; TAKE ONE CAPSULE BY MOUTH TWICE A DAY BEFORE FIRST AND LAST MEAL OF DAY  Dispense: 180 capsule; Refill: 3           Patient Instructions   1.  For GERD, continue lansoprazole 30 mg twice daily.  Refills have been sent to your pharmacy.    2.  For further evaluation of difficulty swallowing we will schedule both an esophagram as well as esophageal manometry.  We will contact you with results once available.  You will be contacted to schedule these imaging test.    3.  We recommend that you follow swallowing precautions by chewing your food thoroughly, eating slowly, and remaining upright for meals.  We also recommend a water wash with meals.  We would recommend avoiding thick meats and breads, as these can be problematic.    4.  For constipation continue MiraLAX.  You may adjust your dosing accordingly based upon how your bowel habits respond.    5.  For colon cancer screening we will proceed with Cologuard testing.  Orders have been placed.  We will contact you with results once received.    6.  Next office follow-up to be determined based upon esophageal manometry finding and esophagram findings.    7.  Continue oral iron therapy.  Refill sent to your pharmacy.        Discussion:    For further evaluation of dysphagia we will proceed with esophageal manometry.  Previous 2 esophageal dilations have been ineffective.  We will continue current PPI therapy.   Patient to continue to implement antireflux precautions and sleep with the head of the bed elevated to prevent nocturnal reflux.  Swallowing precautions were reviewed with patient.  We will also proceed with esophagram for further evaluation.    For constipation, we recommend the patient decrease her MiraLAX down to one half cap daily and adjust dosing accordingly based upon how her bowel habits respond.  Cologuard testing ordered for colon cancer screening.  Additional recommendations pending outcome of imaging and esophageal manometry.  Patient verbalized understand above plan of care and is in agreement.  All questions answered and support provided.    EMR Dragon/Transcription Disclaimer:  This document has been Dictated utilizing Dragon dictation.

## 2023-12-27 RX ORDER — LANSOPRAZOLE 30 MG/1
CAPSULE, DELAYED RELEASE ORAL
Qty: 180 CAPSULE | Refills: 3 | Status: SHIPPED | OUTPATIENT
Start: 2023-12-27

## 2023-12-27 RX ORDER — LANSOPRAZOLE 30 MG/1
CAPSULE, DELAYED RELEASE ORAL
Qty: 180 CAPSULE | Refills: 3 | Status: CANCELLED | OUTPATIENT
Start: 2023-12-27

## 2023-12-27 NOTE — TELEPHONE ENCOUNTER
RN received call back from Radha at Dr. Saez's office and he is fine with patient continuing with her lansoprazole while taking methotrexate. EL

## 2023-12-27 NOTE — TELEPHONE ENCOUNTER
Spoke with patient. She has been taking methotrexate for quite some time with the PPI and has not had any issues. She is taking methotrexate for rheumatoid arthritis.  She stated that her reflux is controlled with her medication, but if she doesn't have it she is miserable.     Mayela is working on getting a clearance from prescribing MD for her to take both meds.

## 2023-12-27 NOTE — TELEPHONE ENCOUNTER
RN called and left voicemail with Dr. Saez's nurse, Radha regarding concurrent use of methotrexate and lansoprazole. Will update chart and provider when she calls back. EL

## 2024-02-07 DIAGNOSIS — D64.9 ANEMIA, UNSPECIFIED TYPE: ICD-10-CM

## 2024-02-08 RX ORDER — FERROUS SULFATE 325(65) MG
1 TABLET ORAL
Qty: 90 TABLET | Refills: 0 | Status: SHIPPED | OUTPATIENT
Start: 2024-02-08

## 2024-02-08 NOTE — TELEPHONE ENCOUNTER
I provided the patient with a 90-day refill.  She also takes lansoprazole and will be due for annual office follow-up.  Please contact the patient to schedule an office follow-up for any additional refills and for reassessment of symptoms.  Thank you.  Yolanda PÉREZ

## 2024-05-09 DIAGNOSIS — D64.9 ANEMIA, UNSPECIFIED TYPE: ICD-10-CM

## 2024-05-10 RX ORDER — FERROUS SULFATE 325(65) MG
1 TABLET ORAL
Qty: 90 TABLET | Refills: 0 | OUTPATIENT
Start: 2024-05-10

## 2025-01-20 ENCOUNTER — TELEPHONE (OUTPATIENT)
Dept: GASTROENTEROLOGY | Facility: CLINIC | Age: 55
End: 2025-01-20
Payer: MEDICARE

## (undated) DEVICE — BITEBLOCK OMNI BLOC

## (undated) DEVICE — VIAL FORMLN CAP 10PCT 20ML

## (undated) DEVICE — MSK ENDO PORT O2 POM ELITE CURAPLEX A/

## (undated) DEVICE — GOWN ISOL W/THUMB UNIV BLU BX/15

## (undated) DEVICE — ADAPT CLN BIOGUARD AIR/H2O DISP

## (undated) DEVICE — TUBING, SUCTION, 1/4" X 10', STRAIGHT: Brand: MEDLINE

## (undated) DEVICE — KT ORCA ORCAPOD DISP STRL

## (undated) DEVICE — INTEGRATED INFLATION/LITHO DEVICE: Brand: ALLIANCE II

## (undated) DEVICE — LN SMPL CO2 SHTRM SD STREAM W/M LUER

## (undated) DEVICE — SINGLE-USE BIOPSY FORCEPS: Brand: RADIAL JAW 4

## (undated) DEVICE — GOWN PROC ENDOARMOR GI LVL3 HY/SHLD UNIV

## (undated) DEVICE — ERBE NESSY®PLATE 170 SPLIT; 168CM²; CABLE 3M: Brand: ERBE

## (undated) DEVICE — FLEX ADVANTAGE 1500CC: Brand: FLEX ADVANTAGE

## (undated) DEVICE — ESOPHAGEAL BALLOON DILATATION CATHETER: Brand: CRE FIXED WIRE

## (undated) DEVICE — DEV LK WIREGUIDE FUSN OLYMP SCP

## (undated) DEVICE — MSK PROC CURAPLEX O2 2/ADAPT 7FT

## (undated) DEVICE — RETRIEVAL BALLOON CATHETER: Brand: EXTRACTOR™ PRO RX

## (undated) DEVICE — BRUSH CYTO MULTI APPL

## (undated) DEVICE — Device

## (undated) DEVICE — SENSR O2 OXIMAX FNGR A/ 18IN NONSTR

## (undated) DEVICE — SPHINCTEROTOME: Brand: HYDRATOME RX 44